# Patient Record
Sex: FEMALE | Race: WHITE | NOT HISPANIC OR LATINO | Employment: OTHER | URBAN - METROPOLITAN AREA
[De-identification: names, ages, dates, MRNs, and addresses within clinical notes are randomized per-mention and may not be internally consistent; named-entity substitution may affect disease eponyms.]

---

## 2023-08-09 LAB — URINE CULTURE: NORMAL

## 2023-08-20 PROBLEM — E86.0 DEHYDRATION: Status: ACTIVE | Noted: 2023-08-20

## 2023-08-20 PROBLEM — E66.3 OVERWEIGHT WITH BODY MASS INDEX (BMI) 25.0-29.9: Status: ACTIVE | Noted: 2023-08-20

## 2023-08-20 PROBLEM — Z86.010 HISTORY OF COLON POLYPS: Status: ACTIVE | Noted: 2023-08-20

## 2023-08-20 PROBLEM — M25.352: Status: ACTIVE | Noted: 2023-08-20

## 2023-08-20 PROBLEM — M17.0 PRIMARY OSTEOARTHRITIS OF BOTH KNEES: Status: ACTIVE | Noted: 2023-08-20

## 2023-08-20 PROBLEM — R26.2 DIFFICULTY WALKING: Status: ACTIVE | Noted: 2023-08-20

## 2023-08-20 PROBLEM — G47.33 OBSTRUCTIVE SLEEP APNEA SYNDROME: Status: ACTIVE | Noted: 2023-08-20

## 2023-08-20 PROBLEM — I10 BENIGN ESSENTIAL HYPERTENSION: Status: ACTIVE | Noted: 2023-08-20

## 2023-08-20 PROBLEM — F41.9 ANXIETY: Status: ACTIVE | Noted: 2023-08-20

## 2023-08-20 PROBLEM — M06.9 RHEUMATOID ARTHRITIS (MULTI): Status: ACTIVE | Noted: 2023-08-20

## 2023-08-20 PROBLEM — R53.1 ASTHENIA: Status: ACTIVE | Noted: 2023-08-20

## 2023-08-20 PROBLEM — I85.10 SECONDARY ESOPHAGEAL VARICES WITHOUT BLEEDING (MULTI): Status: ACTIVE | Noted: 2023-08-20

## 2023-08-20 PROBLEM — G57.92 NEUROPATHY OF LEFT LOWER EXTREMITY: Status: ACTIVE | Noted: 2023-08-20

## 2023-08-20 PROBLEM — M79.604 PAIN OF RIGHT LOWER EXTREMITY: Status: ACTIVE | Noted: 2023-08-20

## 2023-08-20 PROBLEM — M47.816 LUMBAR SPONDYLOSIS: Status: ACTIVE | Noted: 2023-08-20

## 2023-08-20 PROBLEM — M25.562 LEFT KNEE PAIN: Status: ACTIVE | Noted: 2023-08-20

## 2023-08-20 PROBLEM — K74.60 CIRRHOSIS OF LIVER WITHOUT ASCITES (MULTI): Status: ACTIVE | Noted: 2023-08-20

## 2023-08-20 PROBLEM — K70.30 ALCOHOLIC CIRRHOSIS OF LIVER WITHOUT ASCITES (MULTI): Status: ACTIVE | Noted: 2023-08-20

## 2023-08-20 PROBLEM — N18.31 STAGE 3A CHRONIC KIDNEY DISEASE (MULTI): Status: ACTIVE | Noted: 2023-08-20

## 2023-08-20 PROBLEM — R10.9 ABDOMINAL PAIN: Status: ACTIVE | Noted: 2023-08-20

## 2023-08-20 PROBLEM — M17.12 OSTEOARTHRITIS OF LEFT KNEE: Status: ACTIVE | Noted: 2023-08-20

## 2023-08-20 PROBLEM — K21.9 GASTROESOPHAGEAL REFLUX DISEASE: Status: ACTIVE | Noted: 2023-08-20

## 2023-08-20 PROBLEM — M25.552 LEFT HIP PAIN: Status: ACTIVE | Noted: 2023-08-20

## 2023-08-20 PROBLEM — L03.116 CELLULITIS OF LEFT LOWER LEG: Status: ACTIVE | Noted: 2023-08-20

## 2023-08-20 PROBLEM — K74.5 BILIARY CIRRHOSIS (MULTI): Status: ACTIVE | Noted: 2023-08-20

## 2023-08-20 PROBLEM — M16.12 OSTEOARTHRITIS OF LEFT HIP: Status: ACTIVE | Noted: 2023-08-20

## 2023-08-20 PROBLEM — M16.10 HIP ARTHRITIS: Status: ACTIVE | Noted: 2023-08-20

## 2023-08-20 PROBLEM — M81.0 AGE-RELATED OSTEOPOROSIS WITHOUT CURRENT PATHOLOGICAL FRACTURE: Status: ACTIVE | Noted: 2023-08-20

## 2023-08-20 PROBLEM — M17.10 ARTHRITIS OF KNEE: Status: ACTIVE | Noted: 2023-08-20

## 2023-08-20 PROBLEM — J44.9 CHRONIC OBSTRUCTIVE PULMONARY DISEASE (MULTI): Status: ACTIVE | Noted: 2023-08-20

## 2023-08-20 PROBLEM — S69.90XA INJURY OF HAND: Status: ACTIVE | Noted: 2023-08-20

## 2023-08-20 PROBLEM — I35.0 NONRHEUMATIC AORTIC VALVE STENOSIS: Status: ACTIVE | Noted: 2023-08-20

## 2023-08-20 PROBLEM — I85.11 SECONDARY ESOPHAGEAL VARICES WITH BLEEDING (MULTI): Status: ACTIVE | Noted: 2023-08-20

## 2023-08-20 PROBLEM — M13.0 DEGENERATIVE POLYARTHRITIS: Status: ACTIVE | Noted: 2023-08-20

## 2023-08-20 PROBLEM — K74.69 OTHER CIRRHOSIS OF LIVER (MULTI): Status: ACTIVE | Noted: 2023-08-20

## 2023-08-20 PROBLEM — Z96.642 STATUS POST LEFT HIP REPLACEMENT: Status: ACTIVE | Noted: 2023-08-20

## 2023-08-20 PROBLEM — K80.20 SYMPTOMATIC CHOLELITHIASIS: Status: ACTIVE | Noted: 2023-08-20

## 2023-08-20 PROBLEM — E03.8 SUBCLINICAL HYPOTHYROIDISM: Status: ACTIVE | Noted: 2023-08-20

## 2023-08-20 PROBLEM — Z98.890 HISTORY OF HIP SURGERY: Status: ACTIVE | Noted: 2023-08-20

## 2023-08-20 PROBLEM — R93.1 ECHOCARDIOGRAM ABNORMAL: Status: ACTIVE | Noted: 2023-08-20

## 2023-08-20 PROBLEM — R26.81 GAIT INSTABILITY: Status: ACTIVE | Noted: 2023-08-20

## 2023-08-20 PROBLEM — R07.89 ATYPICAL CHEST PAIN: Status: ACTIVE | Noted: 2023-08-20

## 2023-08-20 PROBLEM — M79.89 RIGHT LEG SWELLING: Status: ACTIVE | Noted: 2023-08-20

## 2023-08-20 RX ORDER — CHOLECALCIFEROL (VITAMIN D3) 50 MCG
TABLET ORAL
COMMUNITY

## 2023-08-20 RX ORDER — PANCRELIPASE 24000; 76000; 120000 [USP'U]/1; [USP'U]/1; [USP'U]/1
CAPSULE, DELAYED RELEASE PELLETS ORAL
COMMUNITY
Start: 2022-10-13

## 2023-08-20 RX ORDER — PANTOPRAZOLE SODIUM 40 MG/1
TABLET, DELAYED RELEASE ORAL
COMMUNITY

## 2023-08-20 RX ORDER — LANOLIN ALCOHOL/MO/W.PET/CERES
CREAM (GRAM) TOPICAL
COMMUNITY

## 2023-08-20 RX ORDER — GABAPENTIN 300 MG/1
CAPSULE ORAL
COMMUNITY
Start: 2022-10-03 | End: 2023-10-05 | Stop reason: SDUPTHER

## 2023-08-20 RX ORDER — DULOXETIN HYDROCHLORIDE 30 MG/1
CAPSULE, DELAYED RELEASE ORAL
COMMUNITY
Start: 2023-05-08

## 2023-08-20 RX ORDER — LETROZOLE 2.5 MG/1
TABLET, FILM COATED ORAL
COMMUNITY

## 2023-08-20 RX ORDER — ACETAMINOPHEN AND CODEINE PHOSPHATE 300; 30 MG/1; MG/1
TABLET ORAL
COMMUNITY
Start: 2022-06-10 | End: 2023-10-05 | Stop reason: ALTCHOICE

## 2023-08-20 RX ORDER — LOSARTAN POTASSIUM AND HYDROCHLOROTHIAZIDE 25; 100 MG/1; MG/1
TABLET ORAL
COMMUNITY
End: 2023-12-16 | Stop reason: HOSPADM

## 2023-08-20 RX ORDER — FUROSEMIDE 40 MG/1
TABLET ORAL
COMMUNITY
Start: 2020-09-10

## 2023-08-20 RX ORDER — SPIRONOLACTONE 50 MG/1
TABLET, FILM COATED ORAL
COMMUNITY
Start: 2023-01-12 | End: 2023-12-16 | Stop reason: HOSPADM

## 2023-09-16 ENCOUNTER — HOSPITAL ENCOUNTER (OUTPATIENT)
Dept: DATA CONVERSION | Facility: HOSPITAL | Age: 78
Discharge: HOME | End: 2023-09-16
Payer: MEDICARE

## 2023-09-16 DIAGNOSIS — R39.198 OTHER DIFFICULTIES WITH MICTURITION: ICD-10-CM

## 2023-09-16 DIAGNOSIS — Z88.0 ALLERGY STATUS TO PENICILLIN: ICD-10-CM

## 2023-09-16 DIAGNOSIS — Z90.49 ACQUIRED ABSENCE OF OTHER SPECIFIED PARTS OF DIGESTIVE TRACT: ICD-10-CM

## 2023-09-16 DIAGNOSIS — R10.84 GENERALIZED ABDOMINAL PAIN: ICD-10-CM

## 2023-09-16 DIAGNOSIS — K59.00 CONSTIPATION, UNSPECIFIED: ICD-10-CM

## 2023-09-16 LAB
ALBUMIN SERPL-MCNC: 4.6 GM/DL (ref 3.5–5)
ALBUMIN/GLOB SERPL: 1.3 RATIO (ref 1.5–3)
ALP BLD-CCNC: 148 U/L (ref 35–125)
ALT SERPL-CCNC: 31 U/L (ref 5–40)
ANION GAP SERPL CALCULATED.3IONS-SCNC: 15 MMOL/L (ref 0–19)
AST SERPL-CCNC: 46 U/L (ref 5–40)
BACTERIA SPEC CULT: NORMAL
BACTERIA UR QL AUTO: ABNORMAL
BASOPHILS # BLD AUTO: 0.03 K/UL (ref 0–0.22)
BASOPHILS NFR BLD AUTO: 0.4 % (ref 0–1)
BILIRUB SERPL-MCNC: 1.5 MG/DL (ref 0.1–1.2)
BILIRUB UR QL STRIP.AUTO: NEGATIVE
BUN SERPL-MCNC: 29 MG/DL (ref 8–25)
BUN/CREAT SERPL: 24.2 RATIO (ref 8–21)
CALCIUM SERPL-MCNC: 10.2 MG/DL (ref 8.5–10.4)
CC # UR: NORMAL /UL
CHLORIDE SERPL-SCNC: 98 MMOL/L (ref 97–107)
CLARITY UR: ABNORMAL
CO2 SERPL-SCNC: 23 MMOL/L (ref 24–31)
COLOR UR: ABNORMAL
CREAT SERPL-MCNC: 1.2 MG/DL (ref 0.4–1.6)
DEPRECATED RDW RBC AUTO: 55 FL (ref 37–54)
DIFFERENTIAL METHOD BLD: ABNORMAL
EOSINOPHIL # BLD AUTO: 0.06 K/UL (ref 0–0.45)
EOSINOPHIL NFR BLD: 0.8 % (ref 0–3)
ERYTHROCYTE [DISTWIDTH] IN BLOOD BY AUTOMATED COUNT: 17.6 % (ref 11.7–15)
GFR SERPL CREATININE-BSD FRML MDRD: 47 ML/MIN/1.73 M2
GLOBULIN SER-MCNC: 3.6 G/DL (ref 1.9–3.7)
GLUCOSE SERPL-MCNC: 105 MG/DL (ref 65–99)
GLUCOSE UR STRIP.AUTO-MCNC: NEGATIVE MG/DL
HCT VFR BLD AUTO: 43.2 % (ref 36–44)
HGB BLD-MCNC: 14.2 GM/DL (ref 12–15)
HGB UR QL STRIP.AUTO: ABNORMAL /HPF (ref 0–3)
HGB UR QL: ABNORMAL
IMM GRANULOCYTES # BLD AUTO: 0.02 K/UL (ref 0–0.1)
KETONES UR QL STRIP.AUTO: NEGATIVE
LEUKOCYTE ESTERASE UR QL STRIP.AUTO: ABNORMAL
LIPASE SERPL-CCNC: 23 U/L (ref 16–63)
LYMPHOCYTES # BLD AUTO: 0.72 K/UL (ref 1.2–3.2)
LYMPHOCYTES NFR BLD MANUAL: 9.7 % (ref 20–40)
MCH RBC QN AUTO: 27.9 PG (ref 26–34)
MCHC RBC AUTO-ENTMCNC: 32.9 % (ref 31–37)
MCV RBC AUTO: 84.9 FL (ref 80–100)
MICROSCOPIC (UA): ABNORMAL
MONOCYTES # BLD AUTO: 0.52 K/UL (ref 0–0.8)
MONOCYTES NFR BLD MANUAL: 7 % (ref 0–8)
NEUTROPHILS # BLD AUTO: 6.1 K/UL
NEUTROPHILS # BLD AUTO: 6.1 K/UL (ref 1.8–7.7)
NEUTROPHILS.IMMATURE NFR BLD: 0.3 % (ref 0–1)
NEUTS SEG NFR BLD: 81.8 % (ref 50–70)
NITRITE UR QL STRIP.AUTO: NEGATIVE
NRBC BLD-RTO: 0 /100 WBC
PH UR STRIP.AUTO: 8.5 [PH] (ref 4.6–8)
PLATELET # BLD AUTO: 155 K/UL (ref 150–450)
PMV BLD AUTO: 9.9 CU (ref 7–12.6)
POTASSIUM SERPL-SCNC: 4.3 MMOL/L (ref 3.4–5.1)
PROT SERPL-MCNC: 8.2 G/DL (ref 5.9–7.9)
PROT UR STRIP.AUTO-MCNC: 30 MG/DL
RBC # BLD AUTO: 5.09 M/UL (ref 4–4.9)
REPORT STATUS -LH SQ DATA CONVERSION: NORMAL
SERVICE CMNT-IMP: NORMAL
SODIUM SERPL-SCNC: 136 MMOL/L (ref 133–145)
SP GR UR STRIP.AUTO: 1.01 (ref 1–1.03)
SPECIMEN SOURCE: NORMAL
UNSPECIFIED CRY UR QL COMP ASSIST: ABNORMAL
URINE CULTURE: ABNORMAL
UROBILINOGEN UR QL STRIP.AUTO: 4 MG/DL (ref 0–1)
WBC # BLD AUTO: 7.5 K/UL (ref 4.5–11)
WBC #/AREA URNS AUTO: ABNORMAL /HPF (ref 0–3)

## 2023-10-05 ENCOUNTER — OFFICE VISIT (OUTPATIENT)
Dept: PRIMARY CARE | Facility: CLINIC | Age: 78
End: 2023-10-05
Payer: MEDICARE

## 2023-10-05 VITALS
DIASTOLIC BLOOD PRESSURE: 62 MMHG | WEIGHT: 135 LBS | OXYGEN SATURATION: 96 % | SYSTOLIC BLOOD PRESSURE: 120 MMHG | HEIGHT: 60 IN | RESPIRATION RATE: 16 BRPM | BODY MASS INDEX: 26.5 KG/M2 | HEART RATE: 81 BPM

## 2023-10-05 DIAGNOSIS — K70.30 ALCOHOLIC CIRRHOSIS OF LIVER WITHOUT ASCITES (MULTI): ICD-10-CM

## 2023-10-05 DIAGNOSIS — K59.03 DRUG-INDUCED CONSTIPATION: ICD-10-CM

## 2023-10-05 DIAGNOSIS — M81.0 AGE-RELATED OSTEOPOROSIS WITHOUT CURRENT PATHOLOGICAL FRACTURE: ICD-10-CM

## 2023-10-05 DIAGNOSIS — I10 BENIGN ESSENTIAL HYPERTENSION: ICD-10-CM

## 2023-10-05 DIAGNOSIS — Z85.3 PERSONAL HISTORY OF BREAST CANCER: ICD-10-CM

## 2023-10-05 DIAGNOSIS — M13.0 DEGENERATIVE POLYARTHRITIS: ICD-10-CM

## 2023-10-05 DIAGNOSIS — Z23 IMMUNIZATION DUE: Primary | ICD-10-CM

## 2023-10-05 PROCEDURE — 1036F TOBACCO NON-USER: CPT | Performed by: INTERNAL MEDICINE

## 2023-10-05 PROCEDURE — G0008 ADMIN INFLUENZA VIRUS VAC: HCPCS | Performed by: INTERNAL MEDICINE

## 2023-10-05 PROCEDURE — 3074F SYST BP LT 130 MM HG: CPT | Performed by: INTERNAL MEDICINE

## 2023-10-05 PROCEDURE — 99214 OFFICE O/P EST MOD 30 MIN: CPT | Performed by: INTERNAL MEDICINE

## 2023-10-05 PROCEDURE — 1159F MED LIST DOCD IN RCRD: CPT | Performed by: INTERNAL MEDICINE

## 2023-10-05 PROCEDURE — 1125F AMNT PAIN NOTED PAIN PRSNT: CPT | Performed by: INTERNAL MEDICINE

## 2023-10-05 PROCEDURE — 3078F DIAST BP <80 MM HG: CPT | Performed by: INTERNAL MEDICINE

## 2023-10-05 RX ORDER — POLYETHYLENE GLYCOL 3350 17 G/17G
17 POWDER, FOR SOLUTION ORAL DAILY
COMMUNITY

## 2023-10-05 RX ORDER — TROSPIUM CHLORIDE 20 MG/1
20 TABLET, FILM COATED ORAL 2 TIMES DAILY
COMMUNITY
End: 2023-12-13 | Stop reason: ENTERED-IN-ERROR

## 2023-10-05 RX ORDER — DOCUSATE SODIUM 100 MG/1
100 CAPSULE, LIQUID FILLED ORAL 2 TIMES DAILY
COMMUNITY

## 2023-10-05 ASSESSMENT — ENCOUNTER SYMPTOMS
PALPITATIONS: 1
DIARRHEA: 0
BACK PAIN: 1
DIZZINESS: 0
SEIZURES: 0
CONSTIPATION: 1
LOSS OF SENSATION IN FEET: 0
NAUSEA: 0
DEPRESSION: 1
HEADACHES: 0
OCCASIONAL FEELINGS OF UNSTEADINESS: 0
ARTHRALGIAS: 0
CHILLS: 0
SHORTNESS OF BREATH: 0
ABDOMINAL PAIN: 0
FREQUENCY: 0
FEVER: 0
HEMATURIA: 0

## 2023-10-05 ASSESSMENT — PATIENT HEALTH QUESTIONNAIRE - PHQ9
2. FEELING DOWN, DEPRESSED OR HOPELESS: SEVERAL DAYS
10. IF YOU CHECKED OFF ANY PROBLEMS, HOW DIFFICULT HAVE THESE PROBLEMS MADE IT FOR YOU TO DO YOUR WORK, TAKE CARE OF THINGS AT HOME, OR GET ALONG WITH OTHER PEOPLE: NOT DIFFICULT AT ALL
SUM OF ALL RESPONSES TO PHQ9 QUESTIONS 1 AND 2: 1
1. LITTLE INTEREST OR PLEASURE IN DOING THINGS: NOT AT ALL

## 2023-10-05 ASSESSMENT — PAIN SCALES - GENERAL: PAINLEVEL: 6

## 2023-10-05 NOTE — PROGRESS NOTES
Subjective   Patient ID: Kassandra Warner is a 78 y.o. female who presents for evaluation of constipation that began after starting iron supplement. She stopped taking her iron about 1 week ago. Still c/o intermittent constipation. Takes stool softener. Follows with urologist and denies any urinary frequency and nocturia. Endorses pain in her L lower back that radiates down her L leg. Does not monitor her BP at home and reports occ palpitations.    Medication Documentation Review Audit       Reviewed by Jason Soria MA (Medical Assistant) on 10/05/23 at 1328      Medication Order Taking? Sig Documenting Provider Last Dose Status     Discontinued 10/05/23 1315   cholecalciferol (Vitamin D-3) 50 MCG (2000 UT) tablet [id number] Yes 1 tablet Orally Once a day for 30 day(s) Historical Provider, MD Taking Active   cyanocobalamin (Vitamin B-12) 1,000 mcg tablet [id number] Yes 1 tablet Orally Once a day for 30 day(s) Historical Provider, MD Taking Active   docusate sodium (Colace) 100 mg capsule [id number] Yes Take 1 capsule (100 mg) by mouth 2 times a day. Historical Provider, MD Taking Active   DULoxetine (Cymbalta) 30 mg DR capsule [id number] Yes 1 capsule Orally Once a day for 90 day(s) Historical Provider, MD Taking Active   furosemide (Lasix) 40 mg tablet [id number] Yes 1 tablet Orally once daily for 90 days Historical Provider, MD Taking Active   gabapentin (Neurontin) 300 mg capsule [id number] Yes 1 capsule Orally once at bedtime Historical Provider, MD Taking Active   letrozole (Femara) 2.5 mg tablet [id number] Yes 1 tablet Orally Once a day for 30 day(s) Historical Provider, MD Taking Active   lipase-protease-amylase (Creon) 24,000-76,000 -120,000 unit capsule [id number] Yes as directed Orally twice a day with meal for 30 day(s) Historical Provider, MD Taking Active   losartan-hydrochlorothiazide (Hyzaar) 100-25 mg tablet [id number] Yes 1 tablet Orally Once a day for 90 days Historical Provider, MD Taking Active    pantoprazole (ProtoNix) 40 mg EC tablet [id number] Yes 1 tablet Orally every 12 hours for 90 days Historical Provider, MD Taking Active   polyethylene glycol (Glycolax, Miralax) 17 gram packet [id number] Yes Take 17 g by mouth once daily. Historical Provider, MD Taking Active   spironolactone (Aldactone) 50 mg tablet [id number] Yes 1 tablet Orally Once a day for 90 day(s) Historical Provider, MD Taking Active   trospium (Sanctura) 20 mg tablet [id number] Yes Take 1 tablet (20 mg) by mouth 2 times a day. Trospium chloride Historical Provider, MD Taking Active                  Past Medical History:   Diagnosis Date    Aortic stenosis     Breast cancer (CMS/HCC)     COPD (chronic obstructive pulmonary disease) (CMS/HCC)     Foot ulcer (CMS/HCC)     Gallstones     Hiatal hernia     Hypertension     Left hip pain     Left knee pain     Liver cirrhosis (CMS/HCC)     Low back pain     Rosacea      Past Surgical History:   Procedure Laterality Date    APPENDECTOMY      BREAST LUMPECTOMY Bilateral     CATARACT EXTRACTION, BILATERAL      CHOLECYSTECTOMY      LIVER BIOPSY      REPLACEMENT TOTAL KNEE ONCOLOGIC Right        Review of Systems   Constitutional:  Negative for chills and fever.   HENT:  Negative for dental problem and ear pain.    Respiratory:  Negative for shortness of breath.    Cardiovascular:  Positive for palpitations (occ). Negative for chest pain.   Gastrointestinal:  Positive for constipation. Negative for abdominal pain, diarrhea and nausea.   Endocrine: Negative for cold intolerance.   Genitourinary:  Negative for frequency and hematuria.   Musculoskeletal:  Positive for back pain. Negative for arthralgias.        L leg pain   Skin:  Negative for rash.   Neurological:  Negative for dizziness, seizures and headaches.     Objective   /62   Pulse 81   Resp 16   Ht 1.524 m (5')   Wt 61.2 kg (135 lb)   SpO2 96%   BMI 26.37 kg/m²     Physical Exam  HENT:      Right Ear: Tympanic membrane normal.       Left Ear: Tympanic membrane normal.      Mouth/Throat:      Pharynx: Oropharynx is clear. No oropharyngeal exudate.   Eyes:      Conjunctiva/sclera: Conjunctivae normal.      Pupils: Pupils are equal, round, and reactive to light.   Neck:      Thyroid: No thyromegaly.      Vascular: No carotid bruit.   Cardiovascular:      Rate and Rhythm: Regular rhythm.      Heart sounds: Normal heart sounds.   Pulmonary:      Breath sounds: Normal breath sounds.   Abdominal:      Palpations: Abdomen is soft. There is no hepatomegaly.      Tenderness: There is no abdominal tenderness.   Musculoskeletal:      Right lower le+ Pitting Edema present.      Left lower le+ Pitting Edema present.   Lymphadenopathy:      Cervical: No cervical adenopathy.   Skin:     General: Skin is warm.   Neurological:      Mental Status: She is alert.      Motor: Motor function is intact.      Coordination: Coordination is intact.   Psychiatric:         Mood and Affect: Mood and affect normal.         Behavior: Behavior normal. Behavior is cooperative.         Cognition and Memory: Cognition normal.     Diagnostics Reviewed: 2023 CT abdomen: hiatal hernia, cirrhosis of the liver, stool in colon  Labs Reviewed: 2023 urine culture: mixed kathya, Wbc nml, Rbc nml      Assessment/Plan   Diagnoses and all orders for this visit:  Immunization due  Alcoholic cirrhosis of liver without ascites (CMS/HCC)  Degenerative polyarthritis  Benign essential hypertension  Personal history of breast cancer      By signing my name below, Michelle RODRÍGUEZ Scribe   attest that this documentation has been prepared under the direction and in the presence of Evie Rao MD.

## 2023-10-15 RX ORDER — GABAPENTIN 300 MG/1
CAPSULE ORAL
Qty: 90 CAPSULE | Refills: 3 | Status: SHIPPED | OUTPATIENT
Start: 2023-10-15

## 2023-12-07 ENCOUNTER — APPOINTMENT (OUTPATIENT)
Dept: HEMATOLOGY/ONCOLOGY | Facility: CLINIC | Age: 78
End: 2023-12-07
Payer: MEDICARE

## 2023-12-12 ENCOUNTER — APPOINTMENT (OUTPATIENT)
Dept: RADIOLOGY | Facility: HOSPITAL | Age: 78
DRG: 683 | End: 2023-12-12
Payer: MEDICARE

## 2023-12-12 ENCOUNTER — HOSPITAL ENCOUNTER (INPATIENT)
Facility: HOSPITAL | Age: 78
LOS: 3 days | Discharge: HOME | DRG: 683 | End: 2023-12-16
Attending: EMERGENCY MEDICINE | Admitting: INTERNAL MEDICINE
Payer: MEDICARE

## 2023-12-12 DIAGNOSIS — L03.116 LEFT LEG CELLULITIS: ICD-10-CM

## 2023-12-12 DIAGNOSIS — F41.9 ANXIETY: ICD-10-CM

## 2023-12-12 DIAGNOSIS — R74.01 TRANSAMINASEMIA: ICD-10-CM

## 2023-12-12 DIAGNOSIS — R60.0 LOWER EXTREMITY EDEMA: ICD-10-CM

## 2023-12-12 DIAGNOSIS — M25.352 UNSTABLE LEFT HIP: ICD-10-CM

## 2023-12-12 DIAGNOSIS — M17.12 PRIMARY OSTEOARTHRITIS OF LEFT KNEE: ICD-10-CM

## 2023-12-12 DIAGNOSIS — M25.562 ACUTE PAIN OF LEFT KNEE: ICD-10-CM

## 2023-12-12 DIAGNOSIS — M17.10 ARTHRITIS OF KNEE: ICD-10-CM

## 2023-12-12 DIAGNOSIS — K70.31 ASCITES DUE TO ALCOHOLIC CIRRHOSIS (MULTI): ICD-10-CM

## 2023-12-12 DIAGNOSIS — L03.119 CELLULITIS OF LOWER EXTREMITY, UNSPECIFIED LATERALITY: ICD-10-CM

## 2023-12-12 DIAGNOSIS — E87.6 HYPOKALEMIA: ICD-10-CM

## 2023-12-12 DIAGNOSIS — R10.9 ABDOMINAL PAIN, UNSPECIFIED ABDOMINAL LOCATION: ICD-10-CM

## 2023-12-12 DIAGNOSIS — N17.9 ACUTE KIDNEY INJURY (CMS-HCC): Primary | ICD-10-CM

## 2023-12-12 LAB
ALBUMIN SERPL-MCNC: 3.7 G/DL (ref 3.5–5)
ALP BLD-CCNC: 254 U/L (ref 35–125)
ALT SERPL-CCNC: 35 U/L (ref 5–40)
ANION GAP SERPL CALC-SCNC: 14 MMOL/L
AST SERPL-CCNC: 88 U/L (ref 5–40)
BASOPHILS # BLD AUTO: 0.03 X10*3/UL (ref 0–0.1)
BASOPHILS NFR BLD AUTO: 0.3 %
BILIRUB SERPL-MCNC: 2.1 MG/DL (ref 0.1–1.2)
BUN SERPL-MCNC: 47 MG/DL (ref 8–25)
CALCIUM SERPL-MCNC: 9.5 MG/DL (ref 8.5–10.4)
CHLORIDE SERPL-SCNC: 100 MMOL/L (ref 97–107)
CO2 SERPL-SCNC: 20 MMOL/L (ref 24–31)
CREAT SERPL-MCNC: 2.4 MG/DL (ref 0.4–1.6)
EOSINOPHIL # BLD AUTO: 0.09 X10*3/UL (ref 0–0.4)
EOSINOPHIL NFR BLD AUTO: 1 %
ERYTHROCYTE [DISTWIDTH] IN BLOOD BY AUTOMATED COUNT: 16.3 % (ref 11.5–14.5)
GFR SERPL CREATININE-BSD FRML MDRD: 20 ML/MIN/1.73M*2
GLUCOSE SERPL-MCNC: 136 MG/DL (ref 65–99)
HCT VFR BLD AUTO: 35.3 % (ref 36–46)
HGB BLD-MCNC: 11.6 G/DL (ref 12–16)
IMM GRANULOCYTES # BLD AUTO: 0.03 X10*3/UL (ref 0–0.5)
IMM GRANULOCYTES NFR BLD AUTO: 0.3 % (ref 0–0.9)
LIPASE SERPL-CCNC: 37 U/L (ref 16–63)
LYMPHOCYTES # BLD AUTO: 0.73 X10*3/UL (ref 0.8–3)
LYMPHOCYTES NFR BLD AUTO: 8.4 %
MAGNESIUM SERPL-MCNC: 2.1 MG/DL (ref 1.6–3.1)
MCH RBC QN AUTO: 28.9 PG (ref 26–34)
MCHC RBC AUTO-ENTMCNC: 32.9 G/DL (ref 32–36)
MCV RBC AUTO: 88 FL (ref 80–100)
MONOCYTES # BLD AUTO: 0.96 X10*3/UL (ref 0.05–0.8)
MONOCYTES NFR BLD AUTO: 11.1 %
NEUTROPHILS # BLD AUTO: 6.83 X10*3/UL (ref 1.6–5.5)
NEUTROPHILS NFR BLD AUTO: 78.9 %
NRBC BLD-RTO: 0 /100 WBCS (ref 0–0)
NT-PROBNP SERPL-MCNC: 216 PG/ML (ref 0–624)
PLATELET # BLD AUTO: 171 X10*3/UL (ref 150–450)
POTASSIUM SERPL-SCNC: 3.1 MMOL/L (ref 3.4–5.1)
PROT SERPL-MCNC: 6.8 G/DL (ref 5.9–7.9)
RBC # BLD AUTO: 4.02 X10*6/UL (ref 4–5.2)
SODIUM SERPL-SCNC: 134 MMOL/L (ref 133–145)
TROPONIN T SERPL-MCNC: 26 NG/L
TROPONIN T SERPL-MCNC: 27 NG/L
WBC # BLD AUTO: 8.7 X10*3/UL (ref 4.4–11.3)

## 2023-12-12 PROCEDURE — 84484 ASSAY OF TROPONIN QUANT: CPT | Performed by: CLINICAL NURSE SPECIALIST

## 2023-12-12 PROCEDURE — 74176 CT ABD & PELVIS W/O CONTRAST: CPT

## 2023-12-12 PROCEDURE — 73564 X-RAY EXAM KNEE 4 OR MORE: CPT | Mod: LT,FY

## 2023-12-12 PROCEDURE — 36415 COLL VENOUS BLD VENIPUNCTURE: CPT | Performed by: CLINICAL NURSE SPECIALIST

## 2023-12-12 PROCEDURE — 87636 SARSCOV2 & INF A&B AMP PRB: CPT | Performed by: EMERGENCY MEDICINE

## 2023-12-12 PROCEDURE — 85025 COMPLETE CBC W/AUTO DIFF WBC: CPT | Performed by: CLINICAL NURSE SPECIALIST

## 2023-12-12 PROCEDURE — 70450 CT HEAD/BRAIN W/O DYE: CPT

## 2023-12-12 PROCEDURE — 80053 COMPREHEN METABOLIC PANEL: CPT | Performed by: CLINICAL NURSE SPECIALIST

## 2023-12-12 PROCEDURE — 2500000001 HC RX 250 WO HCPCS SELF ADMINISTERED DRUGS (ALT 637 FOR MEDICARE OP): Performed by: CLINICAL NURSE SPECIALIST

## 2023-12-12 PROCEDURE — 93971 EXTREMITY STUDY: CPT

## 2023-12-12 PROCEDURE — 71046 X-RAY EXAM CHEST 2 VIEWS: CPT | Mod: FY

## 2023-12-12 PROCEDURE — 83735 ASSAY OF MAGNESIUM: CPT | Performed by: CLINICAL NURSE SPECIALIST

## 2023-12-12 PROCEDURE — 99285 EMERGENCY DEPT VISIT HI MDM: CPT | Mod: 25 | Performed by: EMERGENCY MEDICINE

## 2023-12-12 PROCEDURE — 83880 ASSAY OF NATRIURETIC PEPTIDE: CPT | Performed by: CLINICAL NURSE SPECIALIST

## 2023-12-12 PROCEDURE — 83690 ASSAY OF LIPASE: CPT | Performed by: CLINICAL NURSE SPECIALIST

## 2023-12-12 RX ORDER — POTASSIUM CHLORIDE 1.5 G/1.58G
40 POWDER, FOR SOLUTION ORAL ONCE
Status: COMPLETED | OUTPATIENT
Start: 2023-12-12 | End: 2023-12-12

## 2023-12-12 RX ADMIN — POTASSIUM CHLORIDE 40 MEQ: 1.5 POWDER, FOR SOLUTION ORAL at 22:17

## 2023-12-12 ASSESSMENT — LIFESTYLE VARIABLES
EVER HAD A DRINK FIRST THING IN THE MORNING TO STEADY YOUR NERVES TO GET RID OF A HANGOVER: NO
EVER FELT BAD OR GUILTY ABOUT YOUR DRINKING: NO
REASON UNABLE TO ASSESS: NO
HAVE PEOPLE ANNOYED YOU BY CRITICIZING YOUR DRINKING: NO
HAVE YOU EVER FELT YOU SHOULD CUT DOWN ON YOUR DRINKING: NO

## 2023-12-12 ASSESSMENT — COLUMBIA-SUICIDE SEVERITY RATING SCALE - C-SSRS
2. HAVE YOU ACTUALLY HAD ANY THOUGHTS OF KILLING YOURSELF?: NO
1. IN THE PAST MONTH, HAVE YOU WISHED YOU WERE DEAD OR WISHED YOU COULD GO TO SLEEP AND NOT WAKE UP?: NO
6. HAVE YOU EVER DONE ANYTHING, STARTED TO DO ANYTHING, OR PREPARED TO DO ANYTHING TO END YOUR LIFE?: NO

## 2023-12-12 ASSESSMENT — PAIN - FUNCTIONAL ASSESSMENT: PAIN_FUNCTIONAL_ASSESSMENT: 0-10

## 2023-12-12 ASSESSMENT — PAIN DESCRIPTION - PROGRESSION: CLINICAL_PROGRESSION: NOT CHANGED

## 2023-12-13 ENCOUNTER — APPOINTMENT (OUTPATIENT)
Dept: RADIOLOGY | Facility: HOSPITAL | Age: 78
DRG: 683 | End: 2023-12-13
Payer: MEDICARE

## 2023-12-13 PROBLEM — L03.116 LEFT LEG CELLULITIS: Status: RESOLVED | Noted: 2023-08-20 | Resolved: 2023-12-13

## 2023-12-13 PROBLEM — R18.8 ASCITES: Status: ACTIVE | Noted: 2023-12-13

## 2023-12-13 LAB
AFP SERPL-MCNC: 32 NG/ML (ref 0–9)
ALBUMIN FLD-MCNC: 0.6 G/DL
ALBUMIN SERPL-MCNC: 3.3 G/DL (ref 3.5–5)
ALP BLD-CCNC: 249 U/L (ref 35–125)
ALT SERPL-CCNC: 33 U/L (ref 5–40)
AMMONIA PLAS-SCNC: 80 UMOL/L (ref 12–45)
ANION GAP SERPL CALC-SCNC: 14 MMOL/L
APPEARANCE UR: CLEAR
APTT PPP: 27.7 SECONDS (ref 22–32.5)
AST SERPL-CCNC: 85 U/L (ref 5–40)
BASOPHILS NFR FLD MANUAL: 0 %
BILIRUB SERPL-MCNC: 1.9 MG/DL (ref 0.1–1.2)
BILIRUB UR STRIP.AUTO-MCNC: NEGATIVE MG/DL
BLASTS NFR FLD MANUAL: 0 %
BUN SERPL-MCNC: 47 MG/DL (ref 8–25)
CALCIUM SERPL-MCNC: 9.4 MG/DL (ref 8.5–10.4)
CHLORIDE SERPL-SCNC: 104 MMOL/L (ref 97–107)
CLARITY FLD: CLEAR
CO2 SERPL-SCNC: 18 MMOL/L (ref 24–31)
COLOR FLD: YELLOW
COLOR UR: YELLOW
CREAT SERPL-MCNC: 2.1 MG/DL (ref 0.4–1.6)
EOSINOPHIL NFR FLD MANUAL: 0 %
ERYTHROCYTE [DISTWIDTH] IN BLOOD BY AUTOMATED COUNT: 16.3 % (ref 11.5–14.5)
ETHANOL SERPL-MCNC: <0.01 G/DL
FLUAV RNA RESP QL NAA+PROBE: NOT DETECTED
FLUBV RNA RESP QL NAA+PROBE: NOT DETECTED
GFR SERPL CREATININE-BSD FRML MDRD: 24 ML/MIN/1.73M*2
GLUCOSE SERPL-MCNC: 97 MG/DL (ref 65–99)
GLUCOSE UR STRIP.AUTO-MCNC: NORMAL MG/DL
HAV IGM SER QL: NONREACTIVE
HBV CORE IGM SER QL: NONREACTIVE
HBV SURFACE AG SERPL QL IA: NONREACTIVE
HCT VFR BLD AUTO: 33.1 % (ref 36–46)
HCV AB SER QL: NONREACTIVE
HGB BLD-MCNC: 11.1 G/DL (ref 12–16)
HYALINE CASTS #/AREA URNS AUTO: ABNORMAL /LPF
IMMATURE GRANULOCYTES IN FLUID: 0 %
INR PPP: 1.1 (ref 0.9–1.2)
KETONES UR STRIP.AUTO-MCNC: NEGATIVE MG/DL
LEUKOCYTE ESTERASE UR QL STRIP.AUTO: NEGATIVE
LYMPHOCYTES NFR FLD MANUAL: 42 %
MCH RBC QN AUTO: 29.2 PG (ref 26–34)
MCHC RBC AUTO-ENTMCNC: 33.5 G/DL (ref 32–36)
MCV RBC AUTO: 87 FL (ref 80–100)
MONOS+MACROS NFR FLD MANUAL: 52 %
MUCOUS THREADS #/AREA URNS AUTO: ABNORMAL /LPF
NEUTROPHILS NFR FLD MANUAL: 6 %
NITRITE UR QL STRIP.AUTO: NEGATIVE
NRBC BLD-RTO: 0 /100 WBCS (ref 0–0)
OTHER CELLS NFR FLD MANUAL: 0 %
PH UR STRIP.AUTO: 5.5 [PH]
PLASMA CELLS NFR FLD MANUAL: 0 %
PLATELET # BLD AUTO: 132 X10*3/UL (ref 150–450)
POTASSIUM SERPL-SCNC: 3.9 MMOL/L (ref 3.4–5.1)
PROT FLD-MCNC: 1 G/DL
PROT SERPL-MCNC: 6.3 G/DL (ref 5.9–7.9)
PROT UR STRIP.AUTO-MCNC: NORMAL MG/DL
PROTHROMBIN TIME: 11.8 SECONDS (ref 9.3–12.7)
RBC # BLD AUTO: 3.8 X10*6/UL (ref 4–5.2)
RBC # FLD AUTO: 2000 /UL
RBC # UR STRIP.AUTO: NEGATIVE /UL
RBC #/AREA URNS AUTO: ABNORMAL /HPF
SARS-COV-2 RNA RESP QL NAA+PROBE: NOT DETECTED
SODIUM SERPL-SCNC: 136 MMOL/L (ref 133–145)
SP GR UR STRIP.AUTO: 1.01
SQUAMOUS #/AREA URNS AUTO: ABNORMAL /HPF
TOTAL CELLS COUNTED PRT: 100
TRANS CELLS #/AREA UR COMP ASSIST: ABNORMAL /HPF
UROBILINOGEN UR STRIP.AUTO-MCNC: NORMAL MG/DL
WBC # BLD AUTO: 7 X10*3/UL (ref 4.4–11.3)
WBC # FLD AUTO: 311 /UL
WBC #/AREA URNS AUTO: ABNORMAL /HPF

## 2023-12-13 PROCEDURE — 87075 CULTR BACTERIA EXCEPT BLOOD: CPT | Mod: WESLAB | Performed by: INTERNAL MEDICINE

## 2023-12-13 PROCEDURE — 82105 ALPHA-FETOPROTEIN SERUM: CPT | Mod: WESLAB | Performed by: INTERNAL MEDICINE

## 2023-12-13 PROCEDURE — 36415 COLL VENOUS BLD VENIPUNCTURE: CPT | Performed by: INTERNAL MEDICINE

## 2023-12-13 PROCEDURE — 49083 ABD PARACENTESIS W/IMAGING: CPT

## 2023-12-13 PROCEDURE — 84157 ASSAY OF PROTEIN OTHER: CPT | Mod: WESLAB | Performed by: INTERNAL MEDICINE

## 2023-12-13 PROCEDURE — 1200000002 HC GENERAL ROOM WITH TELEMETRY DAILY

## 2023-12-13 PROCEDURE — 2720000007 HC OR 272 NO HCPCS

## 2023-12-13 PROCEDURE — 88112 CYTOPATH CELL ENHANCE TECH: CPT | Performed by: PATHOLOGY

## 2023-12-13 PROCEDURE — C1729 CATH, DRAINAGE: HCPCS

## 2023-12-13 PROCEDURE — 85027 COMPLETE CBC AUTOMATED: CPT | Performed by: INTERNAL MEDICINE

## 2023-12-13 PROCEDURE — 85610 PROTHROMBIN TIME: CPT | Performed by: INTERNAL MEDICINE

## 2023-12-13 PROCEDURE — 82077 ASSAY SPEC XCP UR&BREATH IA: CPT | Performed by: EMERGENCY MEDICINE

## 2023-12-13 PROCEDURE — 36415 COLL VENOUS BLD VENIPUNCTURE: CPT | Performed by: EMERGENCY MEDICINE

## 2023-12-13 PROCEDURE — 88305 TISSUE EXAM BY PATHOLOGIST: CPT | Mod: TC | Performed by: INTERNAL MEDICINE

## 2023-12-13 PROCEDURE — 2500000004 HC RX 250 GENERAL PHARMACY W/ HCPCS (ALT 636 FOR OP/ED): Performed by: INTERNAL MEDICINE

## 2023-12-13 PROCEDURE — 2500000005 HC RX 250 GENERAL PHARMACY W/O HCPCS: Performed by: STUDENT IN AN ORGANIZED HEALTH CARE EDUCATION/TRAINING PROGRAM

## 2023-12-13 PROCEDURE — 89051 BODY FLUID CELL COUNT: CPT | Performed by: INTERNAL MEDICINE

## 2023-12-13 PROCEDURE — 82140 ASSAY OF AMMONIA: CPT | Performed by: EMERGENCY MEDICINE

## 2023-12-13 PROCEDURE — 0W9G3ZX DRAINAGE OF PERITONEAL CAVITY, PERCUTANEOUS APPROACH, DIAGNOSTIC: ICD-10-PCS | Performed by: STUDENT IN AN ORGANIZED HEALTH CARE EDUCATION/TRAINING PROGRAM

## 2023-12-13 PROCEDURE — 81001 URINALYSIS AUTO W/SCOPE: CPT | Performed by: CLINICAL NURSE SPECIALIST

## 2023-12-13 PROCEDURE — 82042 OTHER SOURCE ALBUMIN QUAN EA: CPT | Mod: WESLAB | Performed by: INTERNAL MEDICINE

## 2023-12-13 PROCEDURE — 2500000001 HC RX 250 WO HCPCS SELF ADMINISTERED DRUGS (ALT 637 FOR MEDICARE OP): Performed by: INTERNAL MEDICINE

## 2023-12-13 PROCEDURE — 88305 TISSUE EXAM BY PATHOLOGIST: CPT | Performed by: PATHOLOGY

## 2023-12-13 PROCEDURE — 85730 THROMBOPLASTIN TIME PARTIAL: CPT | Performed by: INTERNAL MEDICINE

## 2023-12-13 PROCEDURE — 87205 SMEAR GRAM STAIN: CPT | Mod: WESLAB | Performed by: INTERNAL MEDICINE

## 2023-12-13 PROCEDURE — 80053 COMPREHEN METABOLIC PANEL: CPT | Performed by: INTERNAL MEDICINE

## 2023-12-13 PROCEDURE — 80074 ACUTE HEPATITIS PANEL: CPT | Mod: WESLAB | Performed by: INTERNAL MEDICINE

## 2023-12-13 RX ORDER — CHOLECALCIFEROL (VITAMIN D3) 50 MCG
2000 TABLET ORAL DAILY
Status: DISCONTINUED | OUTPATIENT
Start: 2023-12-13 | End: 2023-12-16 | Stop reason: HOSPADM

## 2023-12-13 RX ORDER — LIDOCAINE HYDROCHLORIDE 20 MG/ML
INJECTION, SOLUTION EPIDURAL; INFILTRATION; INTRACAUDAL; PERINEURAL
Status: COMPLETED | OUTPATIENT
Start: 2023-12-13 | End: 2023-12-13

## 2023-12-13 RX ORDER — PANTOPRAZOLE SODIUM 40 MG/1
40 TABLET, DELAYED RELEASE ORAL
Status: DISCONTINUED | OUTPATIENT
Start: 2023-12-13 | End: 2023-12-16 | Stop reason: HOSPADM

## 2023-12-13 RX ORDER — POLYETHYLENE GLYCOL 3350 17 G/17G
17 POWDER, FOR SOLUTION ORAL DAILY
Status: DISCONTINUED | OUTPATIENT
Start: 2023-12-13 | End: 2023-12-16 | Stop reason: HOSPADM

## 2023-12-13 RX ORDER — ACETAMINOPHEN 650 MG/1
650 SUPPOSITORY RECTAL EVERY 4 HOURS PRN
Status: DISCONTINUED | OUTPATIENT
Start: 2023-12-13 | End: 2023-12-16 | Stop reason: HOSPADM

## 2023-12-13 RX ORDER — ACETAMINOPHEN 500 MG
5 TABLET ORAL NIGHTLY PRN
Status: DISCONTINUED | OUTPATIENT
Start: 2023-12-13 | End: 2023-12-16 | Stop reason: HOSPADM

## 2023-12-13 RX ORDER — OXYBUTYNIN CHLORIDE 5 MG/1
5 TABLET ORAL 2 TIMES DAILY
Status: DISCONTINUED | OUTPATIENT
Start: 2023-12-13 | End: 2023-12-16 | Stop reason: HOSPADM

## 2023-12-13 RX ORDER — DOXYCYCLINE 100 MG/1
100 CAPSULE ORAL EVERY 12 HOURS SCHEDULED
Status: DISCONTINUED | OUTPATIENT
Start: 2023-12-13 | End: 2023-12-13

## 2023-12-13 RX ORDER — DOCUSATE SODIUM 100 MG/1
100 CAPSULE, LIQUID FILLED ORAL 2 TIMES DAILY
Status: DISCONTINUED | OUTPATIENT
Start: 2023-12-13 | End: 2023-12-16 | Stop reason: HOSPADM

## 2023-12-13 RX ORDER — ACETAMINOPHEN 160 MG/5ML
650 SOLUTION ORAL EVERY 4 HOURS PRN
Status: DISCONTINUED | OUTPATIENT
Start: 2023-12-13 | End: 2023-12-16 | Stop reason: HOSPADM

## 2023-12-13 RX ORDER — MIRABEGRON 25 MG/1
25 TABLET, FILM COATED, EXTENDED RELEASE ORAL DAILY
COMMUNITY

## 2023-12-13 RX ORDER — GABAPENTIN 300 MG/1
300 CAPSULE ORAL NIGHTLY
Status: DISCONTINUED | OUTPATIENT
Start: 2023-12-13 | End: 2023-12-16 | Stop reason: HOSPADM

## 2023-12-13 RX ORDER — FUROSEMIDE 10 MG/ML
40 INJECTION INTRAMUSCULAR; INTRAVENOUS ONCE
Status: COMPLETED | OUTPATIENT
Start: 2023-12-13 | End: 2023-12-13

## 2023-12-13 RX ORDER — POLYETHYLENE GLYCOL 3350 17 G/17G
17 POWDER, FOR SOLUTION ORAL DAILY PRN
Status: DISCONTINUED | OUTPATIENT
Start: 2023-12-13 | End: 2023-12-16 | Stop reason: HOSPADM

## 2023-12-13 RX ORDER — ACETAMINOPHEN 325 MG/1
650 TABLET ORAL EVERY 4 HOURS PRN
Status: DISCONTINUED | OUTPATIENT
Start: 2023-12-13 | End: 2023-12-16 | Stop reason: HOSPADM

## 2023-12-13 RX ORDER — LANOLIN ALCOHOL/MO/W.PET/CERES
1000 CREAM (GRAM) TOPICAL DAILY
Status: DISCONTINUED | OUTPATIENT
Start: 2023-12-13 | End: 2023-12-16 | Stop reason: HOSPADM

## 2023-12-13 RX ORDER — LETROZOLE 2.5 MG/1
2.5 TABLET, FILM COATED ORAL DAILY
Status: DISCONTINUED | OUTPATIENT
Start: 2023-12-13 | End: 2023-12-16 | Stop reason: HOSPADM

## 2023-12-13 RX ADMIN — PANCRELIPASE 1 CAPSULE: 24000; 76000; 120000 CAPSULE, DELAYED RELEASE PELLETS ORAL at 02:46

## 2023-12-13 RX ADMIN — PANTOPRAZOLE SODIUM 40 MG: 40 TABLET, DELAYED RELEASE ORAL at 08:37

## 2023-12-13 RX ADMIN — PANCRELIPASE 1 CAPSULE: 24000; 76000; 120000 CAPSULE, DELAYED RELEASE PELLETS ORAL at 22:00

## 2023-12-13 RX ADMIN — CEFEPIME 1 G: 1 INJECTION, POWDER, FOR SOLUTION INTRAMUSCULAR; INTRAVENOUS at 15:48

## 2023-12-13 RX ADMIN — CYANOCOBALAMIN TAB 1000 MCG 1000 MCG: 1000 TAB at 08:36

## 2023-12-13 RX ADMIN — LIDOCAINE HYDROCHLORIDE 7 ML: 20 INJECTION, SOLUTION EPIDURAL; INFILTRATION; INTRACAUDAL; PERINEURAL at 11:50

## 2023-12-13 RX ADMIN — FUROSEMIDE 40 MG: 10 INJECTION, SOLUTION INTRAMUSCULAR; INTRAVENOUS at 02:46

## 2023-12-13 RX ADMIN — OXYBUTYNIN CHLORIDE 5 MG: 5 TABLET ORAL at 08:36

## 2023-12-13 RX ADMIN — DOCUSATE SODIUM 100 MG: 100 CAPSULE, LIQUID FILLED ORAL at 22:00

## 2023-12-13 RX ADMIN — PANCRELIPASE 1 CAPSULE: 24000; 76000; 120000 CAPSULE, DELAYED RELEASE PELLETS ORAL at 08:36

## 2023-12-13 RX ADMIN — OXYBUTYNIN CHLORIDE 5 MG: 5 TABLET ORAL at 22:00

## 2023-12-13 RX ADMIN — Medication 2000 UNITS: at 08:36

## 2023-12-13 RX ADMIN — GABAPENTIN 300 MG: 300 CAPSULE ORAL at 22:00

## 2023-12-13 RX ADMIN — LETROZOLE 2.5 MG: 2.5 TABLET ORAL at 10:02

## 2023-12-13 RX ADMIN — ACETAMINOPHEN 650 MG: 325 TABLET ORAL at 22:02

## 2023-12-13 SDOH — SOCIAL STABILITY: SOCIAL INSECURITY: HAVE YOU HAD THOUGHTS OF HARMING ANYONE ELSE?: NO

## 2023-12-13 SDOH — SOCIAL STABILITY: SOCIAL INSECURITY: DO YOU FEEL ANYONE HAS EXPLOITED OR TAKEN ADVANTAGE OF YOU FINANCIALLY OR OF YOUR PERSONAL PROPERTY?: NO

## 2023-12-13 SDOH — SOCIAL STABILITY: SOCIAL INSECURITY: DO YOU FEEL UNSAFE GOING BACK TO THE PLACE WHERE YOU ARE LIVING?: NO

## 2023-12-13 SDOH — SOCIAL STABILITY: SOCIAL INSECURITY: ARE YOU OR HAVE YOU BEEN THREATENED OR ABUSED PHYSICALLY, EMOTIONALLY, OR SEXUALLY BY ANYONE?: NO

## 2023-12-13 SDOH — SOCIAL STABILITY: SOCIAL INSECURITY: DOES ANYONE TRY TO KEEP YOU FROM HAVING/CONTACTING OTHER FRIENDS OR DOING THINGS OUTSIDE YOUR HOME?: NO

## 2023-12-13 SDOH — SOCIAL STABILITY: SOCIAL INSECURITY: ABUSE: ADULT

## 2023-12-13 SDOH — SOCIAL STABILITY: SOCIAL INSECURITY: HAS ANYONE EVER THREATENED TO HURT YOUR FAMILY OR YOUR PETS?: NO

## 2023-12-13 SDOH — SOCIAL STABILITY: SOCIAL INSECURITY: WERE YOU ABLE TO COMPLETE ALL THE BEHAVIORAL HEALTH SCREENINGS?: YES

## 2023-12-13 SDOH — SOCIAL STABILITY: SOCIAL INSECURITY: ARE THERE ANY APPARENT SIGNS OF INJURIES/BEHAVIORS THAT COULD BE RELATED TO ABUSE/NEGLECT?: NO

## 2023-12-13 ASSESSMENT — COGNITIVE AND FUNCTIONAL STATUS - GENERAL
CLIMB 3 TO 5 STEPS WITH RAILING: A LOT
TOILETING: A LITTLE
DRESSING REGULAR UPPER BODY CLOTHING: A LITTLE
PATIENT BASELINE BEDBOUND: NO
MOVING TO AND FROM BED TO CHAIR: A LOT
DRESSING REGULAR LOWER BODY CLOTHING: A LOT
CLIMB 3 TO 5 STEPS WITH RAILING: A LOT
DAILY ACTIVITIY SCORE: 15
DRESSING REGULAR LOWER BODY CLOTHING: A LITTLE
TOILETING: A LOT
TURNING FROM BACK TO SIDE WHILE IN FLAT BAD: A LITTLE
MOVING TO AND FROM BED TO CHAIR: A LITTLE
MOVING FROM LYING ON BACK TO SITTING ON SIDE OF FLAT BED WITH BEDRAILS: A LITTLE
MOVING FROM LYING ON BACK TO SITTING ON SIDE OF FLAT BED WITH BEDRAILS: A LITTLE
PERSONAL GROOMING: A LITTLE
STANDING UP FROM CHAIR USING ARMS: A LITTLE
STANDING UP FROM CHAIR USING ARMS: A LITTLE
WALKING IN HOSPITAL ROOM: A LOT
HELP NEEDED FOR BATHING: A LITTLE
HELP NEEDED FOR BATHING: A LOT
MOBILITY SCORE: 16
WALKING IN HOSPITAL ROOM: A LOT
DRESSING REGULAR UPPER BODY CLOTHING: A LOT
MOBILITY SCORE: 15
EATING MEALS: A LITTLE
TURNING FROM BACK TO SIDE WHILE IN FLAT BAD: A LITTLE
DAILY ACTIVITIY SCORE: 19

## 2023-12-13 ASSESSMENT — ACTIVITIES OF DAILY LIVING (ADL)
WALKS IN HOME: INDEPENDENT
HEARING - LEFT EAR: FUNCTIONAL
GROOMING: INDEPENDENT
PATIENT'S MEMORY ADEQUATE TO SAFELY COMPLETE DAILY ACTIVITIES?: YES
HEARING - RIGHT EAR: FUNCTIONAL
DRESSING YOURSELF: INDEPENDENT
TOILETING: INDEPENDENT
ADEQUATE_TO_COMPLETE_ADL: YES
BATHING: INDEPENDENT
FEEDING YOURSELF: INDEPENDENT
JUDGMENT_ADEQUATE_SAFELY_COMPLETE_DAILY_ACTIVITIES: YES
LACK_OF_TRANSPORTATION: YES
ASSISTIVE_DEVICE: WALKER;EYEGLASSES

## 2023-12-13 ASSESSMENT — ENCOUNTER SYMPTOMS
HEMATOLOGIC/LYMPHATIC NEGATIVE: 1
ABDOMINAL PAIN: 1
EYES NEGATIVE: 1
CONSTITUTIONAL NEGATIVE: 1
MUSCULOSKELETAL NEGATIVE: 1
ALLERGIC/IMMUNOLOGIC NEGATIVE: 1
PSYCHIATRIC NEGATIVE: 1
NEUROLOGICAL NEGATIVE: 1
RESPIRATORY NEGATIVE: 1
ENDOCRINE NEGATIVE: 1
CARDIOVASCULAR NEGATIVE: 1

## 2023-12-13 ASSESSMENT — PAIN SCALES - GENERAL
PAINLEVEL_OUTOF10: 2
PAINLEVEL_OUTOF10: 0 - NO PAIN
PAINLEVEL_OUTOF10: 2
PAINLEVEL_OUTOF10: 4
PAINLEVEL_OUTOF10: 0 - NO PAIN

## 2023-12-13 ASSESSMENT — LIFESTYLE VARIABLES
AUDIT-C TOTAL SCORE: 0
SKIP TO QUESTIONS 9-10: 1
HOW OFTEN DO YOU HAVE 6 OR MORE DRINKS ON ONE OCCASION: NEVER
HOW MANY STANDARD DRINKS CONTAINING ALCOHOL DO YOU HAVE ON A TYPICAL DAY: PATIENT DOES NOT DRINK
HOW OFTEN DO YOU HAVE A DRINK CONTAINING ALCOHOL: NEVER
AUDIT-C TOTAL SCORE: 0

## 2023-12-13 ASSESSMENT — PATIENT HEALTH QUESTIONNAIRE - PHQ9
SUM OF ALL RESPONSES TO PHQ9 QUESTIONS 1 & 2: 4
1. LITTLE INTEREST OR PLEASURE IN DOING THINGS: SEVERAL DAYS
2. FEELING DOWN, DEPRESSED OR HOPELESS: NEARLY EVERY DAY

## 2023-12-13 ASSESSMENT — PAIN - FUNCTIONAL ASSESSMENT
PAIN_FUNCTIONAL_ASSESSMENT: 0-10

## 2023-12-13 ASSESSMENT — PAIN DESCRIPTION - LOCATION: LOCATION: ABDOMEN

## 2023-12-13 ASSESSMENT — PAIN DESCRIPTION - DESCRIPTORS: DESCRIPTORS: SORE

## 2023-12-13 NOTE — ED NOTES
Pt assisted to the restroom. Pt has an unsteady gait. Pt educated to call nurse before attempting ambulation.      Niecy Castaneda RN  12/13/23 1482

## 2023-12-13 NOTE — ED PROVIDER NOTES
Department of Emergency Medicine   ED  Provider Note  Admit Date/RoomTime: 12/12/2023  7:33 PM  ED Room: ST26/ST26        History of Present Illness:  Chief Complaint   Patient presents with    Leg Swelling    Weakness, Gen    Abdominal Pain     Pt presents to ED with abdominal pain, generalized weakness and L leg swelling. Pt states that her physician thinks it is a cellulitis on her leg. Pt states that she fell 4 weeks ago from the weakness and that it has been harder to walk.          Kassandra Warner is a 78 y.o. female history of hypertension, neuropathy, depression, cellulitis history presenting to the ED for frequent falls.  Family reports that she feels off balance, she was walking and fell 5 weeks ago causing him to fall on top of her.  Then went to sit down and lost her balance and fell a couple weeks ago.  She injured her knee both times.  Complains of pain and swelling.  Over the last week has noticed increased ankle swelling redness to the left leg and pain.  Went to urgent care was advised to come to the emergency department for evaluation of possible DVT.  Also endorses abdominal pain that started yesterday mid abdomen.  Incontinence to urine over the last 4 to 5 weeks progressively getting worse saw urology and had a scope with no known cause.  Denies nausea vomiting or diarrhea last bowel movement was yesterday normal no black tarry stools or blood in the stool.  Complains of urgency with urination.  No back pain or flank pain.  No blood in the urine or stool.  Complains of depression and increased sleeping since the death of her  5 months ago.,  Also complains of increased abdominal distention.  Family is concerned for diverticulitis    Review of Systems:   Pertinent positives and negatives are stated within HPI, all other systems reviewed and are negative.        --------------------------------------------- PAST HISTORY ---------------------------------------------  Past Medical History:   has a past medical history of Aortic stenosis, Breast cancer (CMS/HCC), COPD (chronic obstructive pulmonary disease) (CMS/HCC), Foot ulcer (CMS/HCC), Gallstones, Hiatal hernia, Hypertension, Left hip pain, Left knee pain, Liver cirrhosis (CMS/HCC), Low back pain, and Rosacea.  Past Surgical History:  has a past surgical history that includes Appendectomy; Replacement total knee oncologic (Right); Breast lumpectomy (Bilateral); Cataract extraction, bilateral; Liver biopsy; and Cholecystectomy.  Social History:  reports that she has never smoked. She has never been exposed to tobacco smoke. She has never used smokeless tobacco. She reports current alcohol use. She reports that she does not use drugs.  Family History: family history includes Breast cancer in her mother; Colon cancer in her father; Diabetes in her father; Heart attack in her brother; Heart disease in her brother and father; Hypertension in her father and mother.. Unless otherwise noted, family history is non contributory  The patient’s home medications have been reviewed.  Allergies: Fentanyl and Penicillins        ---------------------------------------------------PHYSICAL EXAM--------------------------------------    GENERAL APPEARANCE: Awake and alert.   VITAL SIGNS: As per the nurses' triage record.   HEENT: Normocephalic, atraumatic. Extraocular muscles are intact. Pupils equal round and reactive to light. Conjunctiva are pink. Negative scleral icterus. Mucous membranes are moist. Tongue in the midline. Pharynx was without erythema or exudates, uvula midline.  No raccoon eyes or ling signs noted.  No bite to the tongue or lip.  No epistaxis noted.  No contusions abrasions lacerations noted to the face or scalp.  NECK: Soft Nontender and supple, full gross ROM, no meningeal signs.  CHEST: Nontender to palpation. Clear to auscultation bilaterally. No rales, rhonchi, or wheezing.   HEART: S1, S2. Regular rate and rhythm. No murmurs, gallops or rubs.   Strong and equal pulses in the extremities.   ABDOMEN: Soft, diffuse tenderness increased tenderness in the left lower quadrant no rebound or guarding mild distention noted.  distended, positive bowel sounds, no palpable masses.  MUSCULCSKELETAL: Bilateral lower extremity edema +1 right leg +3 left leg.  Mild erythema noted to the anterior mid lower leg distal to the knee.  Not involving the ankle.  Warm to touch.  Peripheral pulses intact.  No calf tenderness with palpation.  Right lower extremity no calf tenderness noted no redness or warmth noted.  Moves upper extremities with difficulty.  NEUROLOGICAL: Awake, alert and oriented x 3. Power intact in the upper and lower extremities. Sensation is intact to light touch in the upper and lower extremities.   IMMUNOLOGICAL: No lymphatic streaking noted   DERM: No petechiae, rashes, or ecchymoses.  Psych: Flat affect        ------------------------- NURSING NOTES AND VITALS REVIEWED ---------------------------  The nursing notes within the ED encounter and vital signs as below have been reviewed by myself  /59   Pulse 82   Temp 36.8 °C (98.2 °F)   Resp 16   Ht 1.524 m (5')   Wt 63.5 kg (140 lb)   SpO2 97%   BMI 27.34 kg/m²     Oxygen Saturation Interpretation: Normal      The patient’s available past medical records and past encounters were reviewed.          -----------------------DIAGNOSTIC RESULTS------------------------  LABS:    Labs Reviewed   CBC WITH AUTO DIFFERENTIAL - Abnormal       Result Value    WBC 8.7      nRBC 0.0      RBC 4.02      Hemoglobin 11.6 (*)     Hematocrit 35.3 (*)     MCV 88      MCH 28.9      MCHC 32.9      RDW 16.3 (*)     Platelets 171      Neutrophils % 78.9      Immature Granulocytes %, Automated 0.3      Lymphocytes % 8.4      Monocytes % 11.1      Eosinophils % 1.0      Basophils % 0.3      Neutrophils Absolute 6.83 (*)     Immature Granulocytes Absolute, Automated 0.03      Lymphocytes Absolute 0.73 (*)     Monocytes  Absolute 0.96 (*)     Eosinophils Absolute 0.09      Basophils Absolute 0.03     COMPREHENSIVE METABOLIC PANEL - Abnormal    Glucose 136 (*)     Sodium 134      Potassium 3.1 (*)     Chloride 100      Bicarbonate 20 (*)     Urea Nitrogen 47 (*)     Creatinine 2.40 (*)     eGFR 20 (*)     Calcium 9.5      Albumin 3.7      Alkaline Phosphatase 254 (*)     Total Protein 6.8      AST 88 (*)     Bilirubin, Total 2.1 (*)     ALT 35      Anion Gap 14     SERIAL TROPONIN, INITIAL ([facility]) - Abnormal    Troponin T, High Sensitivity 27 (*)    LIPASE - Normal    Lipase 37     N-TERMINAL PROBNP - Normal    PROBNP 216      Narrative:     Reference ranges are based on clinical submission data. These ranges represent the 95th percentile of normal cut-off points. As NT Pro- BNP values approach 1000 pg/ml, clinical symptoms are more likely associated with CHF.   MAGNESIUM - Normal    Magnesium 2.10     URINALYSIS WITH REFLEX MICROSCOPIC   TROPONIN T SERIES, HIGH SENSITIVITY (0, 2 HR, 6 HR)    Narrative:     The following orders were created for panel order Troponin T Series, High Sensitivity (0, 2HR, 6HR).  Procedure                               Abnormality         Status                     ---------                               -----------         ------                     Serial Troponin, Initial...[[id number]]  Abnormal            Final result               Serial Troponin, 2 Hour ...[[id number]]                                                   Please view results for these tests on the individual orders.   SERIAL TROPONIN,  2 HOUR ([facility])       As interpreted by me, the above displayed labs are abnormal. All other labs obtained during this visit were within normal range or not returned as of this dictation.      EKG per attending note.        Lower extremity venous duplex left   Final Result   No evidence of deep vein thrombosis.        Signed by: Rickey Flores 12/12/2023 9:25 PM   Dictation workstation:   [id number]      XR  chest 2 views   Final Result   No focal airspace disease with coarse vascular markings and very mild   central vascular congestion with left ventricular enlargement   unchanged from previous. No focal airspace disease.        Signed by: Rickey Flores 12/12/2023 9:09 PM   Dictation workstation:   [id number]      XR knee left 4+ views   Final Result   Osteoarthritis is severe in the patellofemoral joint more mild in the   mediolateral compartments with chondrocalcinosis noted. Extensive   subcutaneous edema is seen surrounding the knee. This is suggestive   of fluid overload though an injury may have this appearance.                  Signed by: Rickey Flores 12/12/2023 9:11 PM   Dictation workstation:   [id number]      CT head wo IV contrast    (Results Pending)   CT abdomen pelvis wo IV contrast    (Results Pending)           Lower extremity venous duplex left   Final Result   No evidence of deep vein thrombosis.        Signed by: Rickey Flores 12/12/2023 9:25 PM   Dictation workstation:   [id number]      XR chest 2 views   Final Result   No focal airspace disease with coarse vascular markings and very mild   central vascular congestion with left ventricular enlargement   unchanged from previous. No focal airspace disease.        Signed by: Rickye Flores 12/12/2023 9:09 PM   Dictation workstation:   [id number]      XR knee left 4+ views   Final Result   Osteoarthritis is severe in the patellofemoral joint more mild in the   mediolateral compartments with chondrocalcinosis noted. Extensive   subcutaneous edema is seen surrounding the knee. This is suggestive   of fluid overload though an injury may have this appearance.                  Signed by: Rickey Flores 12/12/2023 9:11 PM   Dictation workstation:   [id number]      CT head wo IV contrast    (Results Pending)   CT abdomen pelvis wo IV contrast    (Results Pending)           ------------------------------ ED COURSE/MEDICAL DECISION  MAKING----------------------  Medical Decision Making:   Exam: A medically appropriate exam performed, outlined above, given the known history and presentation.    History obtained from: Review of medical record patient and family      Social Determinants of Health considered during this visit: Lives at home      PAST MEDICAL HISTORY/Chronic Conditions Affecting Care     has a past medical history of Aortic stenosis, Breast cancer (CMS/HCC), COPD (chronic obstructive pulmonary disease) (CMS/HCC), Foot ulcer (CMS/HCC), Gallstones, Hiatal hernia, Hypertension, Left hip pain, Left knee pain, Liver cirrhosis (CMS/HCC), Low back pain, and Rosacea.       CC/HPI Summary, Social Determinants of health, Records Reviewed, DDx, testing done/not done, ED Course, Reassessment, disposition considerations/shared decision making with patient, consults, disposition:   Presents with left leg swelling knee pain abdominal pain lightheadedness.  Seen evaluated in urgent care referred to the emergency department  Plan:   CT abdomen pelvis pending  CT head pending  X-ray chest pending  X-ray left knee pending  Doppler left leg pending  EKG  CBC  CMP  Lipase  proBNP  Troponin  Urine    Medical Decision Making/Differential Diagnosis:  Differentials include not limited to knee fracture left versus DVT versus cellulitis versus CHF versus electrode abnormality versus anemia versus dehydration versus depression versus diverticulitis versus obstruction versus UTI versus closed head injury versus intercranial bleed  Review:  Troponin 27  Lipase 37  proBNP 216  White blood cell count 8.7  Hemoglobin 11.6  Glucose 136  Potassium 3.1  Bicarb 20  BUN 47  Creatinine 2.4  Alkaline phosphatase 254  AST 88  Total bili 2.1  ALT 35  Patient presented with multiple complaints.  Workup is pending at this time for testing CT head and neck chest x-ray x-ray left knee Doppler study.  White blood cell count 8.7.  Urine pending.  Initial troponin elevated at 27.   EKG per attending note.  Mild anemia.  No signs of active bleeding.  Potassium noted to be low at 3.1 replaced in the emergency department.  Electrolyte imbalance noted.  Acute kidney injury.  LFTs are elevated as well.    Workup pending at this time.  Please see attending note for final impression disposition reevaluation.  Patient seen evaluated with attending physician Dr. Cooley    PROCEDURES  Unless otherwise noted below, none      CONSULTS:   None      ED Course as of 12/12/23 2135   Tue Dec 12, 2023   2120 EKG: Sinus rhythm with PAC present.  Rate is 90 bpm.  Left axis deviation.  QTc is 504 ms which is slightly prolonged, PA interval 180.  No ST elevation or depression, no acute ischemic pattern.  No STEMI. [NT]      ED Course User Index  [NT] Aguila Cooley DO         Diagnoses as of 12/12/23 2135   Acute kidney injury (CMS/HCC)   Hypokalemia   Abdominal pain, unspecified abdominal location   Acute pain of left knee   Lower extremity edema   Left leg cellulitis   Transaminasemia         This patient has remained hemodynamically stable during their ED course.      Critical Care: none        Counseling:  The emergency provider has spoken with the patient and family  and discussed today’s results, in addition to providing specific details for the plan of care and counseling regarding the diagnosis and prognosis.  Questions are answered at this time and they are agreeable with the plan.         --------------------------------- IMPRESSION AND DISPOSITION ---------------------------------    IMPRESSION  1. Acute kidney injury (CMS/HCC)    2. Hypokalemia    3. Abdominal pain, unspecified abdominal location    4. Acute pain of left knee    5. Lower extremity edema    6. Left leg cellulitis    7. Transaminasemia        DISPOSITION  Disposition: Pending  Patient condition is stable        NOTE: This report was transcribed using voice recognition software. Every effort was made to ensure accuracy; however,  inadvertent computerized transcription errors may be present      SPRING Gonzales  12/12/23 2114       SPRING Gonzales  12/12/23 2130

## 2023-12-13 NOTE — PROGRESS NOTES
Kassandra Warner is a 78 y.o. female on day 0 of admission presenting with Acute kidney injury (CMS/HCC).      Subjective   Pt c/o LE edema, mostly Rt-sided, knee pain.  Positive for some abdominal pain as well.       Objective     Last Recorded Vitals  /51   Pulse 66   Temp 36.8 °C (98.2 °F)   Resp 20   Wt 63.5 kg (140 lb)   SpO2 94%   Intake/Output last 3 Shifts:  No intake or output data in the 24 hours ending 12/13/23 1516    Admission Weight  Weight: 63.5 kg (140 lb) (12/12/23 2000)    Daily Weight  12/12/23 : 63.5 kg (140 lb)    Image Results  US guided abdominal paracentesis  Narrative: Interpreted By:  Loli Hong,   STUDY:  US GUIDED ABDOMINAL PARACENTESIS; 12/13/2023 12:12 pm      INDICATION:  Signs/Symptoms:Ascites.      COMPARISON:  CT scan dated 12/12/2023      ACCESSION NUMBER(S):  [id number]      ORDERING CLINICIAN:  RENEE VILLANUEVA      TECHNIQUE:  Ultrasound-guided paracentesis      FINDINGS:  Informed consent obtained. Patient positioned supine. Left lower  quadrant prepped, draped and anesthetized. Under ultrasound guidance,  8 Angolan centesis sheath needle inserted into peritoneal cavity. 100  mLof clear yellowfluid aspiratedwith sample sent for analysis.  Patient tolerated procedure well      Impression: Ultrasound-guided diagnostic paracentesis.      Signed by: Loli Hong 12/13/2023 1:47 PM  Dictation workstation:   [id number]  ECG 12 lead  Sinus rhythm with Premature atrial complexes  Left axis deviation  Low voltage QRS  Inferior infarct , age undetermined  Possible Anterolateral infarct , age undetermined  Prolonged QT  Abnormal ECG  No previous ECGs available      Physical Exam  Seen and examined in ED, room 4  Patient is alert, cooperative with exam.  She is in no apparent respiratory distress.  Skin: There is erythema with some induration of the skin of the distal left lower extremity.  No open wounds.  Lungs are diminished bilaterally.  No wheezes, rales,  rhonchi.  Heart: Regular rate with systolic murmur.  Abdomen is increased in size with ascites.  Mildly tender diffusely.  Bowel sounds positive.  Patient moves all extremities.  Relevant Results             Results for orders placed or performed during the hospital encounter of 12/12/23 (from the past 24 hour(s))   ECG 12 lead   Result Value Ref Range    Ventricular Rate 90 BPM    Atrial Rate 90 BPM    DE Interval 180 ms    QRS Duration 100 ms    QT Interval 412 ms    QTC Calculation(Bazett) 504 ms    P Axis -5 degrees    R Axis -59 degrees    T Axis 60 degrees    QRS Count 14 beats    Q Onset 215 ms    P Onset 125 ms    P Offset 183 ms    T Offset 421 ms    QTC Fredericia 471 ms   CBC and Auto Differential   Result Value Ref Range    WBC 8.7 4.4 - 11.3 x10*3/uL    nRBC 0.0 0.0 - 0.0 /100 WBCs    RBC 4.02 4.00 - 5.20 x10*6/uL    Hemoglobin 11.6 (L) 12.0 - 16.0 g/dL    Hematocrit 35.3 (L) 36.0 - 46.0 %    MCV 88 80 - 100 fL    MCH 28.9 26.0 - 34.0 pg    MCHC 32.9 32.0 - 36.0 g/dL    RDW 16.3 (H) 11.5 - 14.5 %    Platelets 171 150 - 450 x10*3/uL    Neutrophils % 78.9 40.0 - 80.0 %    Immature Granulocytes %, Automated 0.3 0.0 - 0.9 %    Lymphocytes % 8.4 13.0 - 44.0 %    Monocytes % 11.1 2.0 - 10.0 %    Eosinophils % 1.0 0.0 - 6.0 %    Basophils % 0.3 0.0 - 2.0 %    Neutrophils Absolute 6.83 (H) 1.60 - 5.50 x10*3/uL    Immature Granulocytes Absolute, Automated 0.03 0.00 - 0.50 x10*3/uL    Lymphocytes Absolute 0.73 (L) 0.80 - 3.00 x10*3/uL    Monocytes Absolute 0.96 (H) 0.05 - 0.80 x10*3/uL    Eosinophils Absolute 0.09 0.00 - 0.40 x10*3/uL    Basophils Absolute 0.03 0.00 - 0.10 x10*3/uL   Comprehensive metabolic panel   Result Value Ref Range    Glucose 136 (H) 65 - 99 mg/dL    Sodium 134 133 - 145 mmol/L    Potassium 3.1 (L) 3.4 - 5.1 mmol/L    Chloride 100 97 - 107 mmol/L    Bicarbonate 20 (L) 24 - 31 mmol/L    Urea Nitrogen 47 (H) 8 - 25 mg/dL    Creatinine 2.40 (H) 0.40 - 1.60 mg/dL    eGFR 20 (L) >60  mL/min/1.73m*2    Calcium 9.5 8.5 - 10.4 mg/dL    Albumin 3.7 3.5 - 5.0 g/dL    Alkaline Phosphatase 254 (H) 35 - 125 U/L    Total Protein 6.8 5.9 - 7.9 g/dL    AST 88 (H) 5 - 40 U/L    Bilirubin, Total 2.1 (H) 0.1 - 1.2 mg/dL    ALT 35 5 - 40 U/L    Anion Gap 14 <=19 mmol/L   Lipase   Result Value Ref Range    Lipase 37 16 - 63 U/L   NT Pro-BNP   Result Value Ref Range    PROBNP 216 0 - 624 pg/mL   Serial Troponin, Initial ([facility])   Result Value Ref Range    Troponin T, High Sensitivity 27 (H) <=15 ng/L   Magnesium   Result Value Ref Range    Magnesium 2.10 1.60 - 3.10 mg/dL   Serial Troponin, 6 Hour ([facility])   Result Value Ref Range    Troponin T, High Sensitivity 26 (H) <=15 ng/L   Sars-CoV-2 and Influenza A/B PCR   Result Value Ref Range    Flu A Result Not Detected Not Detected    Flu B Result Not Detected Not Detected    Coronavirus 2019, PCR Not Detected Not Detected   Urinalysis with Reflex Microscopic   Result Value Ref Range    Color, Urine Yellow Light-Yellow, Yellow, Dark-Yellow    Appearance, Urine Clear Clear    Specific Gravity, Urine 1.012 1.005 - 1.035    pH, Urine 5.5 5.0, 5.5, 6.0, 6.5, 7.0, 7.5, 8.0    Protein, Urine 10 (TRACE) NEGATIVE, 10 (TRACE), 20 (TRACE) mg/dL    Glucose, Urine Normal Normal mg/dL    Blood, Urine NEGATIVE NEGATIVE    Ketones, Urine NEGATIVE NEGATIVE mg/dL    Bilirubin, Urine NEGATIVE NEGATIVE    Urobilinogen, Urine Normal Normal mg/dL    Nitrite, Urine NEGATIVE NEGATIVE    Leukocyte Esterase, Urine NEGATIVE NEGATIVE   Alcohol   Result Value Ref Range    Alcohol <0.010 0.000 - 0.010 g/dL   Ammonia   Result Value Ref Range    Ammonia 80 (H) 12 - 45 umol/L   Microscopic Only, Urine   Result Value Ref Range    WBC, Urine 1-5 1-5, NONE /HPF    RBC, Urine 1-2 NONE, 1-2, 3-5 /HPF    Squamous Epithelial Cells, Urine 1-9 (SPARSE) Reference range not established. /HPF    Transitional Epithelial Cells, Urine 1-2 (FEW) Reference range not established. /HPF    Mucus, Urine FEW Reference  range not established. /LPF    Hyaline Casts, Urine 4+ (A) NONE /LPF   Comprehensive Metabolic Panel   Result Value Ref Range    Glucose 97 65 - 99 mg/dL    Sodium 136 133 - 145 mmol/L    Potassium 3.9 3.4 - 5.1 mmol/L    Chloride 104 97 - 107 mmol/L    Bicarbonate 18 (L) 24 - 31 mmol/L    Urea Nitrogen 47 (H) 8 - 25 mg/dL    Creatinine 2.10 (H) 0.40 - 1.60 mg/dL    eGFR 24 (L) >60 mL/min/1.73m*2    Calcium 9.4 8.5 - 10.4 mg/dL    Albumin 3.3 (L) 3.5 - 5.0 g/dL    Alkaline Phosphatase 249 (H) 35 - 125 U/L    Total Protein 6.3 5.9 - 7.9 g/dL    AST 85 (H) 5 - 40 U/L    Bilirubin, Total 1.9 (H) 0.1 - 1.2 mg/dL    ALT 33 5 - 40 U/L    Anion Gap 14 <=19 mmol/L   Protime-INR   Result Value Ref Range    Protime 11.8 9.3 - 12.7 seconds    INR 1.1 0.9 - 1.2   aPTT   Result Value Ref Range    aPTT 27.7 22.0 - 32.5 seconds   CBC   Result Value Ref Range    WBC 7.0 4.4 - 11.3 x10*3/uL    nRBC 0.0 0.0 - 0.0 /100 WBCs    RBC 3.80 (L) 4.00 - 5.20 x10*6/uL    Hemoglobin 11.1 (L) 12.0 - 16.0 g/dL    Hematocrit 33.1 (L) 36.0 - 46.0 %    MCV 87 80 - 100 fL    MCH 29.2 26.0 - 34.0 pg    MCHC 33.5 32.0 - 36.0 g/dL    RDW 16.3 (H) 11.5 - 14.5 %    Platelets 132 (L) 150 - 450 x10*3/uL   AFP Tumor Marker   Result Value Ref Range    Alpha-Fetoprotein 32 (H) 0 - 9 ng/mL   Hepatitis panel, acute   Result Value Ref Range    Hepatitis B Surface AG Nonreactive Nonreactive    Hepatitis A  AB- IgM Nonreactive Nonreactive    Hepatitis B Core AB; IgM Nonreactive Nonreactive    Hepatitis C AB Nonreactive Nonreactive   Body Fluid Cell Count   Result Value Ref Range    Color, Fluid Yellow Colorless, Straw, Yellow    Clarity, Fluid Clear Clear    WBC, Fluid 311 See Comment /uL    RBC, Fluid 2,000 0  /uL /uL   Body Fluid Differential   Result Value Ref Range    Neutrophils %, Manual, Fluid 6 <25 % %    Lymphocytes %, Manual, Fluid 42 <75 % %    Mono/Macrophages %, Manual, Fluid 52 <70 % %    Eosinophils %, Manual, Fluid 0 0 % %    Basophils %, Manual,  Fluid 0 0 % %    Immature Granulocytes %, Manual, Fluid 0 0 % %    Blasts %, Manual, Fluid 0 0 % %    Unclassified Cells %, Manual, Fluid 0 0 % %    Plasma Cells %, Manual, Fluid 0 0 % %    Total Cells Counted, Fluid 100     US guided abdominal paracentesis    Result Date: 12/13/2023  Interpreted By:  Loli Hong, STUDY: US GUIDED ABDOMINAL PARACENTESIS; 12/13/2023 12:12 pm   INDICATION: Signs/Symptoms:Ascites.   COMPARISON: CT scan dated 12/12/2023   ACCESSION NUMBER(S): [id number]   ORDERING CLINICIAN: RENEE VILLANUEVA   TECHNIQUE: Ultrasound-guided paracentesis   FINDINGS: Informed consent obtained. Patient positioned supine. Left lower quadrant prepped, draped and anesthetized. Under ultrasound guidance, 8 Vietnamese centesis sheath needle inserted into peritoneal cavity. 100 mLof clear yellowfluid aspiratedwith sample sent for analysis. Patient tolerated procedure well       Ultrasound-guided diagnostic paracentesis.   Signed by: Loli Hong 12/13/2023 1:47 PM Dictation workstation:   [id number]    ECG 12 lead    Result Date: 12/13/2023  Sinus rhythm with Premature atrial complexes Left axis deviation Low voltage QRS Inferior infarct , age undetermined Possible Anterolateral infarct , age undetermined Prolonged QT Abnormal ECG No previous ECGs available    CT abdomen pelvis wo IV contrast    Result Date: 12/12/2023  Interpreted By:  Rickey Flores, STUDY: CT ABDOMEN PELVIS WO IV CONTRAST; 12/12/2023 9:39 pm   INDICATION: Signs/Symptoms:Abdominal pain.   COMPARISON: 19 September 2023   ACCESSION NUMBER(S): [id number]   ORDERING CLINICIAN: JERSEY DUPREE   TECHNIQUE: CT of the Abdomen and Pelvis was performed. Axial, sagittal and coronal reformatted images were reviewed.   PROCEDURE: Helical CT images were obtained through the abdomen and pelvis at 3 mm collimation.   CT was performed with one or more of the following dose reduction techniques: automated exposure control, adjustment of the mA and/or kV  according to patient size, or use of iterative reconstruction technique.   FINDINGS: Chest: Mild dependent atelectasis without infiltrate or effusion. Calcified aortic valve and mitral valve demonstrated.   Esophagus, stomach, duodenum and small bowel: Distal esophagus is slightly patulous with small hiatal hernia. The stomach is incompletely distended but unremarkable. The duodenum is without abnormality. No dilated small bowel loops seen.   Pancreas:  There is atrophy of the pancreas without focal lesion. No cyst or mass in the pancreas.   Liver: Nodular surface contour with tiny subcentimeter low-attenuation lesion anterior segment right hepatic lobe likely representing cysts but too small to further characterize seen. Ascites surrounds the liver.   Gallbladder and bile ducts: Post cholecystectomy without choledocholithiasis or intra or extrahepatic ductal dilatation.   Adrenal Glands: No abnormality of either adrenal.   Spleen: Splenomegaly demonstrated with tortuous dilated splenic vein. Ascites is seen adjacent to the spleen.   Kidneys and ureters: No obstructive uropathy. No nephrolithiasis. No ureterolithiasis.   Urinary Bladder: Beam hardening artifact from the left hip arthroplasty obscures the incompletely urine distended bladder but no focal abnormality of the bladder demonstrated.   Appendix: No CT evidence of appendicitis.   Large Bowel: Mild ascending, very mild transverse and mild descending and rectosigmoid colonic stool burden. No stercoral ulcer or focal thickening of the walls of large bowel. There is extensive distal descending to sigmoid diverticulosis without focal diverticulitis with a caliber change that may be due to peristalsis without wall thickening of the mid sigmoid colon from air distension to a narrowed lumen but again without wall thickening.   Intraperitoneal space: Ascites is seen in the pelvis and each colonic gutter as well as surrounding the liver and spleen consistent with  sequelae of cirrhosis.   Vasculature: Moderate atherosclerotic disease of the nondilated abdominal aorta and iliac vessels is again seen.   Lymph Nodes: No inguinal, pelvic sidewall, retroperitoneal or mesenteric adenopathy with small lymph node seen amongst the mild central ascites.   Reproductive Organs: Calcified fibroid seen. Adnexa are unremarkable.   Bones/Joints: Discogenic degenerative disease with vacuum phenomenon multiple levels of the lower thoracic and lumbar spine with disc height loss without wedge compression or compression injury. Artifact from left hip arthroplasty.   Soft Tissues: Thin abdominal wall without abdominal wall defect. Levoscoliosis of the lumbar spine. Severe degenerative change of the right hip without acute osseous abnormality. Left hip arthroplasty is incompletely seen with the portions visualized appear intact given the artifacts. There is diffuse mild to moderate anasarca.       1. Cirrhosis with ascites and anasarca now seen compared to the prior exam. 2. Colonic diverticulosis without diverticulitis. No obstruction or ileus. No enteritis or colitis. No stercoral ulcer with much less stool throughout the large bowel compared to the prior study. Much less stool in the rectum compared to the prior study. 3. Small hiatal hernia. 4. No obstructive uropathy. 5. No choledocholithiasis or ductal dilatation. 6. No adenopathy throughout the examination.         This report has been produced using speech recognition. This exam is available in DICOM format to non-affiliated healthcare facilities on a secure media free searchable basis with prior patient authorization. The patient exposure is reported to a radiation dose index registry. All CT examinations are performed with one or more of the following dose reduction techniques: Automated Exposure Control, Adjustment of mA and/or KV according to patient size, or use of iterative reconstruction techniques.   Signed by: Rickey Flores  12/12/2023 9:58 PM Dictation workstation:   [id number]    CT head wo IV contrast    Result Date: 12/12/2023  Interpreted By:  Rickey Flores, STUDY: CT HEAD WO IV CONTRAST; 12/12/2023 9:38 pm   INDICATION: fall /  felling off balance.   COMPARISON: 8 August 2020.   ACCESSION NUMBER(S): [id number]   ORDERING CLINICIAN: JERSEY DUPREE   TECHNIQUE: CT was performed with one or more of the following dose reduction techniques: automated exposure control, adjustment of the mA and/or kV according to patient size, or use of iterative reconstruction technique.       PROCEDURE: 3.0 mm axial images were obtained through the brain, to include the posterior fossa without intravenous contrast enhancement.   FINDINGS: There is symmetric volume loss of the cerebral hemispheres. Very mild decreased attenuation in the bilateral periventricular white matter, consistent with microangiopathy is noted.  There is no encephalomalacic change. Brainstem is unremarkable. Cerebellar hemispheres are symmetric. No intra- or extra-axial mass or abnormal blood products are demonstrated.   The paranasal sinuses and mastoids as well as calvarium are unremarkable. Calvarium is unremarkable. Soft tissues are unremarkable. No posttraumatic abnormality demonstrated.       1. No acute intracranial findings. 2. Symmetric volume loss of the cerebral hemispheres. 3. Periventricular Microangiopathy. 4. No posttraumatic abnormality.   This report has been produced using speech recognition. This exam is available in DICOM format to non-affiliated healthcare facilities on a secure media free searchable basis with prior patient authorization. The patient exposure is reported to a radiation dose index registry. All CT examinations are performed with one or more of the following dose reduction techniques: Automated Exposure Control, Adjustment of mA and/or KV according to patient size, or use of iterative reconstruction techniques.   MACRO: None   Signed by: Rickey  Mark 12/12/2023 9:49 PM Dictation workstation:   [id number]    Lower extremity venous duplex left    Result Date: 12/12/2023  Interpreted By:  Rickey Flores, STUDY: [facility] LOWER EXTREMITY VENOUS DUPLEX LEFT; 12/12/2023; 12/12/2023 9:18 pm   INDICATION: Signs/Symptoms:pain swelling.   ACCESSION NUMBER(S): [id number]   ORDERING CLINICIAN: JERSEY DUPREE   COMPARISON: 9 August 2020.   TECHNIQUE: PROCEDURE: Grayscale, 2D, color Doppler, Doppler spectral analysis (waveform) and duplex images obtained.   FINDINGS: Patent with normal flow, and compressibility of the common and superficial femoral, popliteal, and greater saphenous veins  as well as in the tibioperoneal trunk and trifurcation.  There is no subcutaneous edema.       No evidence of deep vein thrombosis.   Signed by: Rickey Flores 12/12/2023 9:25 PM Dictation workstation:   [id number]    XR knee left 4+ views    Result Date: 12/12/2023  Interpreted By:  Rickey Flores, STUDY: XR KNEE LEFT 4+ VIEWS; 12/12/2023 8:54 pm   INDICATION: Signs/Symptoms:pain  swelling.   COMPARISON: 21 July 2022.   ACCESSION NUMBER(S): [id number]   ORDERING CLINICIAN: JERSEY DUPREE   TECHNIQUE: AP, lateral tunnel and oblique views of the left knee were performed.   FINDINGS: There is diffuse subcutaneous edema. Chondrocalcinosis in the mediolateral compartment seen with very mild joint space narrowing. Severe patellofemoral osteoarthritis and joint space narrowing with sclerosis and osteophytosis seen. Trace suprapatellar effusion is seen. Atherosclerotic disease noted. No acute fracture or dislocation. No periostitis.       Osteoarthritis is severe in the patellofemoral joint more mild in the mediolateral compartments with chondrocalcinosis noted. Extensive subcutaneous edema is seen surrounding the knee. This is suggestive of fluid overload though an injury may have this appearance.       Signed by: Rickey Flores 12/12/2023 9:11 PM Dictation workstation:   [id number]    XR chest 2  views    Result Date: 12/12/2023  Interpreted By:  Rickey Flores, STUDY: XR CHEST 2 VIEWS; 12/12/2023 8:54 pm   INDICATION: Signs/Symptoms:dizziness.   COMPARISON: 13 August 2022   ACCESSION NUMBER(S): [id number]   ORDERING CLINICIAN: JERSEY DUPREE   TECHNIQUE: AP erect view of the chest were performed.   FINDINGS: The left lung is adequately inflated. Elevation of the right hemidiaphragm is again seen  cardiomegaly with greater left ventricular enlargement than the other chambers is unchanged. There is very mild central vascular congestion with coarse vascular markings.       No focal airspace disease with coarse vascular markings and very mild central vascular congestion with left ventricular enlargement unchanged from previous. No focal airspace disease.   Signed by: Rickey Flores 12/12/2023 9:09 PM Dictation workstation:   [id number]    Assessment/Plan                  Principal Problem:    Acute kidney injury (CMS/HCC)  Active Problems:    Hypokalemia    Lower extremity edema    Transaminasemia    Ascites    Acute kidney failure, possible hepatorenal syndrome.  Renal function is a little bit better today.  Followed by nephrologist  Liver cirrhosis due to alcohol abuse in the past, patient is sober since 2020.  Ascites  Spontaneous bacterial peritonitis.  Ascitic fluid is consistent with SBP due to elevated white count.  Started on cefepime  Left lower extremity cellulitis.  Change antibiotic to cefepime.  Time spent with patient 30 minutes.              Meghan William MD

## 2023-12-13 NOTE — H&P
History Of Present Illness  Kassandra Warner is a 78 y.o. female presenting with history of falls, pain in the left knee.    Reports 2 falls in the last few weeks.  She had a fall 4 to 5 weeks ago when she apparently tripped.  Her son was with her and unsuccessfully attempted to prevent her from falling.  She had another fall about 2 weeks later while in the kitchen of her home.  She did not lose consciousness.  She was unable to get up and called her son who came to help her.  She says she came to the emergency room because of pain in the left knee resulting from the falls.  During workup, she was found to have an elevated serum creatinine of 2.4, up from 1.2 in 9/2023.  A CT scan of the abdomen pelvis showed ascites which appears to be new and cirrhosis of the liver.  The patient has a history of alcohol use and stopped drinking in 2020 per her report.  Upon further review of medical records, she was reported to have cirrhosis of the liver when admitted to the hospital in August 2020.  An EGD on 8/4/2020 showed grade 1 esophageal varices.  The patient reports that he had been sleeping excessively.  She lost her  4 months ago and thinks that she is depressed.  She is admitted for further evaluation of acute kidney failure and ascites    Past Medical History  Past Medical History:   Diagnosis Date    Aortic stenosis     Breast cancer (CMS/HCC)     COPD (chronic obstructive pulmonary disease) (CMS/HCC)     Foot ulcer (CMS/HCC)     Gallstones     Hiatal hernia     Hypertension     Left hip pain     Left knee pain     Liver cirrhosis (CMS/HCC)     Low back pain     Rosacea        Surgical History  Past Surgical History:   Procedure Laterality Date    APPENDECTOMY      BREAST LUMPECTOMY Bilateral     CATARACT EXTRACTION, BILATERAL      CHOLECYSTECTOMY      LIVER BIOPSY      TOTAL KNEE ARTHROPLASTY Right         Social History  She reports that she has never smoked. She has never been exposed to tobacco smoke. She  has never used smokeless tobacco. She reports that she does not currently use alcohol. She reports that she does not use drugs.    Family History  Family History   Problem Relation Name Age of Onset    Breast cancer Mother      Hypertension Mother      Colon cancer Father      Diabetes Father      Heart disease Father      Hypertension Father      Heart attack Brother          Cause of death    Heart disease Brother          Allergies  Fentanyl and Penicillins    Review of Systems   General: Negative for fever,  chills or fatigue.    HEENT: Negative for headache, blurring of vision or double vision.    Cardiovascular: Negative for chest pain, palpitations or orthopnea.    Respiratory: Negative for cough, shortness of breath or wheezing.    Gastrointestinal: Negative for nausea, vomiting, hematemesis, abdominal pain or diarrhea.   Genitourinary: Negative for dysuria, hematuria, frequency or nocturia.   Musculoskeletal: As in the HPI   Skin: Negative for rash, itching, or jaundice.   Hematologic: Negative for bleeding or bruising.   Neurologic: Negative for headache, loss of consciousness. syncope or seizures   Psychological:  As in the HPI       Physical Exam    General.: Awake alert well-developed, responsive   HEENT: Normocephalic, not icteric, not pale, no facial asymmetry, no pharyngeal erythema.   Neck: Supple, no carotid bruit, no thyroid enlargement.   Cardiovascular: Regular heart rate and rhythm normal S1 and S2 with a systolic ejection murmur.    Respiratory: Equal breath sounds bilaterally clear to auscultation.   Abdomen: Soft, nontender to palpation, distended, tympanitic to percussion, bowel sounds present and normoactive.   Extremities: Bilateral pitting pedal and leg edema extending to the thighs   Neurologic: Alert and oriented to self, place and date, muscle strength 5/5 in all extremities.   Dermatologic: No rash, ecchymosis, or jaundice.   Psychological: Appropriate affect and behavior.      Last  Recorded Vitals  Blood pressure 123/51, pulse 73, temperature 36.8 °C (98.2 °F), resp. rate 18, height 1.524 m (5'), weight 63.5 kg (140 lb), SpO2 95 %.    Relevant Results  Results for orders placed or performed during the hospital encounter of 12/12/23 (from the past 24 hour(s))   CBC and Auto Differential   Result Value Ref Range    WBC 8.7 4.4 - 11.3 x10*3/uL    nRBC 0.0 0.0 - 0.0 /100 WBCs    RBC 4.02 4.00 - 5.20 x10*6/uL    Hemoglobin 11.6 (L) 12.0 - 16.0 g/dL    Hematocrit 35.3 (L) 36.0 - 46.0 %    MCV 88 80 - 100 fL    MCH 28.9 26.0 - 34.0 pg    MCHC 32.9 32.0 - 36.0 g/dL    RDW 16.3 (H) 11.5 - 14.5 %    Platelets 171 150 - 450 x10*3/uL    Neutrophils % 78.9 40.0 - 80.0 %    Immature Granulocytes %, Automated 0.3 0.0 - 0.9 %    Lymphocytes % 8.4 13.0 - 44.0 %    Monocytes % 11.1 2.0 - 10.0 %    Eosinophils % 1.0 0.0 - 6.0 %    Basophils % 0.3 0.0 - 2.0 %    Neutrophils Absolute 6.83 (H) 1.60 - 5.50 x10*3/uL    Immature Granulocytes Absolute, Automated 0.03 0.00 - 0.50 x10*3/uL    Lymphocytes Absolute 0.73 (L) 0.80 - 3.00 x10*3/uL    Monocytes Absolute 0.96 (H) 0.05 - 0.80 x10*3/uL    Eosinophils Absolute 0.09 0.00 - 0.40 x10*3/uL    Basophils Absolute 0.03 0.00 - 0.10 x10*3/uL   Comprehensive metabolic panel   Result Value Ref Range    Glucose 136 (H) 65 - 99 mg/dL    Sodium 134 133 - 145 mmol/L    Potassium 3.1 (L) 3.4 - 5.1 mmol/L    Chloride 100 97 - 107 mmol/L    Bicarbonate 20 (L) 24 - 31 mmol/L    Urea Nitrogen 47 (H) 8 - 25 mg/dL    Creatinine 2.40 (H) 0.40 - 1.60 mg/dL    eGFR 20 (L) >60 mL/min/1.73m*2    Calcium 9.5 8.5 - 10.4 mg/dL    Albumin 3.7 3.5 - 5.0 g/dL    Alkaline Phosphatase 254 (H) 35 - 125 U/L    Total Protein 6.8 5.9 - 7.9 g/dL    AST 88 (H) 5 - 40 U/L    Bilirubin, Total 2.1 (H) 0.1 - 1.2 mg/dL    ALT 35 5 - 40 U/L    Anion Gap 14 <=19 mmol/L   Lipase   Result Value Ref Range    Lipase 37 16 - 63 U/L   NT Pro-BNP   Result Value Ref Range    PROBNP 216 0 - 624 pg/mL   Serial Troponin,  Initial ([facility])   Result Value Ref Range    Troponin T, High Sensitivity 27 (H) <=15 ng/L   Magnesium   Result Value Ref Range    Magnesium 2.10 1.60 - 3.10 mg/dL   Serial Troponin, 6 Hour ([facility])   Result Value Ref Range    Troponin T, High Sensitivity 26 (H) <=15 ng/L   Sars-CoV-2 and Influenza A/B PCR   Result Value Ref Range    Flu A Result Not Detected Not Detected    Flu B Result Not Detected Not Detected    Coronavirus 2019, PCR Not Detected Not Detected   Urinalysis with Reflex Microscopic   Result Value Ref Range    Color, Urine Yellow Light-Yellow, Yellow, Dark-Yellow    Appearance, Urine Clear Clear    Specific Gravity, Urine 1.012 1.005 - 1.035    pH, Urine 5.5 5.0, 5.5, 6.0, 6.5, 7.0, 7.5, 8.0    Protein, Urine 10 (TRACE) NEGATIVE, 10 (TRACE), 20 (TRACE) mg/dL    Glucose, Urine Normal Normal mg/dL    Blood, Urine NEGATIVE NEGATIVE    Ketones, Urine NEGATIVE NEGATIVE mg/dL    Bilirubin, Urine NEGATIVE NEGATIVE    Urobilinogen, Urine Normal Normal mg/dL    Nitrite, Urine NEGATIVE NEGATIVE    Leukocyte Esterase, Urine NEGATIVE NEGATIVE   Alcohol   Result Value Ref Range    Alcohol <0.010 0.000 - 0.010 g/dL   Ammonia   Result Value Ref Range    Ammonia 80 (H) 12 - 45 umol/L   Microscopic Only, Urine   Result Value Ref Range    WBC, Urine 1-5 1-5, NONE /HPF    RBC, Urine 1-2 NONE, 1-2, 3-5 /HPF    Squamous Epithelial Cells, Urine 1-9 (SPARSE) Reference range not established. /HPF    Transitional Epithelial Cells, Urine 1-2 (FEW) Reference range not established. /HPF    Mucus, Urine FEW Reference range not established. /LPF    Hyaline Casts, Urine 4+ (A) NONE /LPF     CT abdomen pelvis wo IV contrast    Result Date: 12/12/2023  Interpreted By:  Rickey Flores, STUDY: CT ABDOMEN PELVIS WO IV CONTRAST; 12/12/2023 9:39 pm   INDICATION: Signs/Symptoms:Abdominal pain.   COMPARISON: 19 September 2023   ACCESSION NUMBER(S): [id number]   ORDERING CLINICIAN: JERSEY DUPREE   TECHNIQUE: CT of the Abdomen and Pelvis was  performed. Axial, sagittal and coronal reformatted images were reviewed.   PROCEDURE: Helical CT images were obtained through the abdomen and pelvis at 3 mm collimation.   CT was performed with one or more of the following dose reduction techniques: automated exposure control, adjustment of the mA and/or kV according to patient size, or use of iterative reconstruction technique.   FINDINGS: Chest: Mild dependent atelectasis without infiltrate or effusion. Calcified aortic valve and mitral valve demonstrated.   Esophagus, stomach, duodenum and small bowel: Distal esophagus is slightly patulous with small hiatal hernia. The stomach is incompletely distended but unremarkable. The duodenum is without abnormality. No dilated small bowel loops seen.   Pancreas:  There is atrophy of the pancreas without focal lesion. No cyst or mass in the pancreas.   Liver: Nodular surface contour with tiny subcentimeter low-attenuation lesion anterior segment right hepatic lobe likely representing cysts but too small to further characterize seen. Ascites surrounds the liver.   Gallbladder and bile ducts: Post cholecystectomy without choledocholithiasis or intra or extrahepatic ductal dilatation.   Adrenal Glands: No abnormality of either adrenal.   Spleen: Splenomegaly demonstrated with tortuous dilated splenic vein. Ascites is seen adjacent to the spleen.   Kidneys and ureters: No obstructive uropathy. No nephrolithiasis. No ureterolithiasis.   Urinary Bladder: Beam hardening artifact from the left hip arthroplasty obscures the incompletely urine distended bladder but no focal abnormality of the bladder demonstrated.   Appendix: No CT evidence of appendicitis.   Large Bowel: Mild ascending, very mild transverse and mild descending and rectosigmoid colonic stool burden. No stercoral ulcer or focal thickening of the walls of large bowel. There is extensive distal descending to sigmoid diverticulosis without focal diverticulitis with a  caliber change that may be due to peristalsis without wall thickening of the mid sigmoid colon from air distension to a narrowed lumen but again without wall thickening.   Intraperitoneal space: Ascites is seen in the pelvis and each colonic gutter as well as surrounding the liver and spleen consistent with sequelae of cirrhosis.   Vasculature: Moderate atherosclerotic disease of the nondilated abdominal aorta and iliac vessels is again seen.   Lymph Nodes: No inguinal, pelvic sidewall, retroperitoneal or mesenteric adenopathy with small lymph node seen amongst the mild central ascites.   Reproductive Organs: Calcified fibroid seen. Adnexa are unremarkable.   Bones/Joints: Discogenic degenerative disease with vacuum phenomenon multiple levels of the lower thoracic and lumbar spine with disc height loss without wedge compression or compression injury. Artifact from left hip arthroplasty.   Soft Tissues: Thin abdominal wall without abdominal wall defect. Levoscoliosis of the lumbar spine. Severe degenerative change of the right hip without acute osseous abnormality. Left hip arthroplasty is incompletely seen with the portions visualized appear intact given the artifacts. There is diffuse mild to moderate anasarca.       1. Cirrhosis with ascites and anasarca now seen compared to the prior exam. 2. Colonic diverticulosis without diverticulitis. No obstruction or ileus. No enteritis or colitis. No stercoral ulcer with much less stool throughout the large bowel compared to the prior study. Much less stool in the rectum compared to the prior study. 3. Small hiatal hernia. 4. No obstructive uropathy. 5. No choledocholithiasis or ductal dilatation. 6. No adenopathy throughout the examination.         This report has been produced using speech recognition. This exam is available in DICOM format to non-affiliated healthcare facilities on a secure media free searchable basis with prior patient authorization. The patient  exposure is reported to a radiation dose index registry. All CT examinations are performed with one or more of the following dose reduction techniques: Automated Exposure Control, Adjustment of mA and/or KV according to patient size, or use of iterative reconstruction techniques.   Signed by: Rickey Flores 12/12/2023 9:58 PM Dictation workstation:   [id number]    CT head wo IV contrast    Result Date: 12/12/2023  Interpreted By:  Rickey Flores, STUDY: CT HEAD WO IV CONTRAST; 12/12/2023 9:38 pm   INDICATION: fall /  felling off balance.   COMPARISON: 8 August 2020.   ACCESSION NUMBER(S): [id number]   ORDERING CLINICIAN: JERSEY DUPREE   TECHNIQUE: CT was performed with one or more of the following dose reduction techniques: automated exposure control, adjustment of the mA and/or kV according to patient size, or use of iterative reconstruction technique.       PROCEDURE: 3.0 mm axial images were obtained through the brain, to include the posterior fossa without intravenous contrast enhancement.   FINDINGS: There is symmetric volume loss of the cerebral hemispheres. Very mild decreased attenuation in the bilateral periventricular white matter, consistent with microangiopathy is noted.  There is no encephalomalacic change. Brainstem is unremarkable. Cerebellar hemispheres are symmetric. No intra- or extra-axial mass or abnormal blood products are demonstrated.   The paranasal sinuses and mastoids as well as calvarium are unremarkable. Calvarium is unremarkable. Soft tissues are unremarkable. No posttraumatic abnormality demonstrated.       1. No acute intracranial findings. 2. Symmetric volume loss of the cerebral hemispheres. 3. Periventricular Microangiopathy. 4. No posttraumatic abnormality.   This report has been produced using speech recognition. This exam is available in DICOM format to non-affiliated healthcare facilities on a secure media free searchable basis with prior patient authorization. The patient  exposure is reported to a radiation dose index registry. All CT examinations are performed with one or more of the following dose reduction techniques: Automated Exposure Control, Adjustment of mA and/or KV according to patient size, or use of iterative reconstruction techniques.   MACRO: None   Signed by: Rickey Flores 12/12/2023 9:49 PM Dictation workstation:   [id number]    Lower extremity venous duplex left    Result Date: 12/12/2023  Interpreted By:  Rickey Flores, STUDY: [facility] US LOWER EXTREMITY VENOUS DUPLEX LEFT; 12/12/2023; 12/12/2023 9:18 pm   INDICATION: Signs/Symptoms:pain swelling.   ACCESSION NUMBER(S): [id number]   ORDERING CLINICIAN: JERSEY DUPREE   COMPARISON: 9 August 2020.   TECHNIQUE: PROCEDURE: Grayscale, 2D, color Doppler, Doppler spectral analysis (waveform) and duplex images obtained.   FINDINGS: Patent with normal flow, and compressibility of the common and superficial femoral, popliteal, and greater saphenous veins  as well as in the tibioperoneal trunk and trifurcation.  There is no subcutaneous edema.       No evidence of deep vein thrombosis.   Signed by: Rickey Flores 12/12/2023 9:25 PM Dictation workstation:   [id number]    XR knee left 4+ views    Result Date: 12/12/2023  Interpreted By:  Rickey Flores, STUDY: XR KNEE LEFT 4+ VIEWS; 12/12/2023 8:54 pm   INDICATION: Signs/Symptoms:pain  swelling.   COMPARISON: 21 July 2022.   ACCESSION NUMBER(S): [id number]   ORDERING CLINICIAN: JERSEY DUPREE   TECHNIQUE: AP, lateral tunnel and oblique views of the left knee were performed.   FINDINGS: There is diffuse subcutaneous edema. Chondrocalcinosis in the mediolateral compartment seen with very mild joint space narrowing. Severe patellofemoral osteoarthritis and joint space narrowing with sclerosis and osteophytosis seen. Trace suprapatellar effusion is seen. Atherosclerotic disease noted. No acute fracture or dislocation. No periostitis.       Osteoarthritis is severe in the patellofemoral  joint more mild in the mediolateral compartments with chondrocalcinosis noted. Extensive subcutaneous edema is seen surrounding the knee. This is suggestive of fluid overload though an injury may have this appearance.       Signed by: Rickey Flores 12/12/2023 9:11 PM Dictation workstation:   [id number]    XR chest 2 views    Result Date: 12/12/2023  Interpreted By:  Rickey Flores, STUDY: XR CHEST 2 VIEWS; 12/12/2023 8:54 pm   INDICATION: Signs/Symptoms:dizziness.   COMPARISON: 13 August 2022   ACCESSION NUMBER(S): [id number]   ORDERING CLINICIAN: JERSEY DUPREE   TECHNIQUE: AP erect view of the chest were performed.   FINDINGS: The left lung is adequately inflated. Elevation of the right hemidiaphragm is again seen  cardiomegaly with greater left ventricular enlargement than the other chambers is unchanged. There is very mild central vascular congestion with coarse vascular markings.       No focal airspace disease with coarse vascular markings and very mild central vascular congestion with left ventricular enlargement unchanged from previous. No focal airspace disease.   Signed by: Rickey Flores 12/12/2023 9:09 PM Dictation workstation:   [id number]        Assessment/Plan   Principal Problem:    Acute kidney injury (CMS/HCC)  Active Problems:    Hypokalemia    Lower extremity edema    Transaminasemia    Ascites      Acute kidney failure  Occurring in the setting of fluid overload and liver cirrhosis with ascites.    Hold oral losartan-hydrochlorothiazide and furosemide  Consult nephrology.  Consider hepatorenal syndrome    Elevated liver enzymes, cirrhosis of the liver with ascites  Interventional radiology consult for diagnostic paracentesis  Hepatitis panel, alpha-fetoprotein  Gastroenterology consult    Hypokalemia  Potassium has been replaced    Fluid overload  Furosemide 40 mg IV x 1    Pain in the left knee  Osteoarthritis and suprapatellar effusion  Pain medication as needed    Elevated ammonia  She is awake  and alert.  Will hold on lactulose    Heart murmur  The patient states she has been aware of this murmur for a long time.  A systolic murmur is also documented in a cardiology consult note from 8/2020    Depressed mood  She currently takes Cymbalta at home, holding this because of elevated creatinine.        Jaymie Trevizo MD

## 2023-12-13 NOTE — ED NOTES
Pt placed on external cath. No other needs stated. Pt remains on the monitor. Bed is low and locked. Plan of care ongoing.      Niecy Castaneda RN  12/13/23 [id number]

## 2023-12-13 NOTE — ED NOTES
Pt observed asleep. RR are even and unlabored. Bed is low and locked. Call light in reach. Plan of care ongoing.      Niecy Castaneda RN  12/13/23 [id number]

## 2023-12-13 NOTE — CONSULTS
Inpatient consult to Gastroenterology  Consult performed by: Nathalie Copeland, APRN-CNP  Consult ordered by: Jaymie Trevizo MD      Reason For Consult  Abnormal liver enzymes, liver cirrhosis, ascites    History Of Present Illness  Kassandra Warner is a 78 y.o. female presenting with frequent falls and complaints of abdominal pain. Patient has a history of cirrhosis.  ER workup included a CT scan abdomen/pelvis which showed Cirrhosis with ascites and anasarca now seen compared to the prior exam. Labs show AST 85, ALT 33, bili 1.9, INR 1.1 The patient has a history of alcohol abuse and stopped drinking in 2020. Last EGD on 9/2021 by Dr. Keith showed small hiatal hernia, grade I esophageal varices, portal hypertensive gastropathy, negative biopsy. Patient has mild abd pain with distension. Denies any nausea, vomiting. No complaints of melena.    Past Medical History  She has a past medical history of Aortic stenosis, Breast cancer (CMS/HCC), COPD (chronic obstructive pulmonary disease) (CMS/HCC), Foot ulcer (CMS/HCC), Gallstones, Hiatal hernia, Hypertension, Left hip pain, Left knee pain, Liver cirrhosis (CMS/HCC), Low back pain, and Rosacea.    Surgical History  She has a past surgical history that includes Appendectomy; Breast lumpectomy (Bilateral); Cataract extraction, bilateral; Liver biopsy; Cholecystectomy; and Total knee arthroplasty (Right).     Social History  She reports that she has never smoked. She has never been exposed to tobacco smoke. She has never used smokeless tobacco. She reports that she does not currently use alcohol. She reports that she does not use drugs.    Family History  Family History   Problem Relation Name Age of Onset    Breast cancer Mother      Hypertension Mother      Colon cancer Father      Diabetes Father      Heart disease Father      Hypertension Father      Heart attack Brother          Cause of death    Heart disease Brother          Allergies  Fentanyl and  Penicillins    Review of Systems   Constitutional: Negative.    HENT: Negative.     Eyes: Negative.    Respiratory: Negative.     Cardiovascular: Negative.    Gastrointestinal:  Positive for abdominal pain.   Endocrine: Negative.    Genitourinary: Negative.    Musculoskeletal: Negative.    Skin: Negative.    Allergic/Immunologic: Negative.    Neurological: Negative.    Hematological: Negative.    Psychiatric/Behavioral: Negative.          Physical Exam  HENT:      Head: Normocephalic.      Nose: Nose normal.      Mouth/Throat:      Mouth: Mucous membranes are moist.   Eyes:      Pupils: Pupils are equal, round, and reactive to light.   Cardiovascular:      Rate and Rhythm: Normal rate.   Pulmonary:      Effort: Pulmonary effort is normal.   Abdominal:      General: There is distension.   Musculoskeletal:         General: Normal range of motion.      Cervical back: Normal range of motion.   Skin:     General: Skin is warm.   Neurological:      General: No focal deficit present.      Mental Status: She is alert.   Psychiatric:         Mood and Affect: Mood normal.          Last Recorded Vitals  Blood pressure 125/68, pulse 84, temperature 36.8 °C (98.2 °F), resp. rate 18, height 1.524 m (5'), weight 63.5 kg (140 lb), SpO2 92 %.    Relevant Results  US guided abdominal paracentesis    Result Date: 12/13/2023  Interpreted By:  Loli Hong, STUDY: US GUIDED ABDOMINAL PARACENTESIS; 12/13/2023 12:12 pm   INDICATION: Signs/Symptoms:Ascites.   COMPARISON: CT scan dated 12/12/2023   ACCESSION NUMBER(S): [id number]   ORDERING CLINICIAN: RENEE VILLANUEVA   TECHNIQUE: Ultrasound-guided paracentesis   FINDINGS: Informed consent obtained. Patient positioned supine. Left lower quadrant prepped, draped and anesthetized. Under ultrasound guidance, 8 Lao centesis sheath needle inserted into peritoneal cavity. 100 mLof clear yellowfluid aspiratedwith sample sent for analysis. Patient tolerated procedure well        Ultrasound-guided diagnostic paracentesis.   Signed by: Loli Hong 12/13/2023 1:47 PM Dictation workstation:   [id number]    ECG 12 lead    Result Date: 12/13/2023  Sinus rhythm with Premature atrial complexes Left axis deviation Low voltage QRS Inferior infarct , age undetermined Possible Anterolateral infarct , age undetermined Prolonged QT Abnormal ECG No previous ECGs available    CT abdomen pelvis wo IV contrast    Result Date: 12/12/2023  Interpreted By:  Rickey Flores, STUDY: CT ABDOMEN PELVIS WO IV CONTRAST; 12/12/2023 9:39 pm   INDICATION: Signs/Symptoms:Abdominal pain.   COMPARISON: 19 September 2023   ACCESSION NUMBER(S): [id number]   ORDERING CLINICIAN: JERSEY DUPREE   TECHNIQUE: CT of the Abdomen and Pelvis was performed. Axial, sagittal and coronal reformatted images were reviewed.   PROCEDURE: Helical CT images were obtained through the abdomen and pelvis at 3 mm collimation.   CT was performed with one or more of the following dose reduction techniques: automated exposure control, adjustment of the mA and/or kV according to patient size, or use of iterative reconstruction technique.   FINDINGS: Chest: Mild dependent atelectasis without infiltrate or effusion. Calcified aortic valve and mitral valve demonstrated.   Esophagus, stomach, duodenum and small bowel: Distal esophagus is slightly patulous with small hiatal hernia. The stomach is incompletely distended but unremarkable. The duodenum is without abnormality. No dilated small bowel loops seen.   Pancreas:  There is atrophy of the pancreas without focal lesion. No cyst or mass in the pancreas.   Liver: Nodular surface contour with tiny subcentimeter low-attenuation lesion anterior segment right hepatic lobe likely representing cysts but too small to further characterize seen. Ascites surrounds the liver.   Gallbladder and bile ducts: Post cholecystectomy without choledocholithiasis or intra or extrahepatic ductal dilatation.   Adrenal  Glands: No abnormality of either adrenal.   Spleen: Splenomegaly demonstrated with tortuous dilated splenic vein. Ascites is seen adjacent to the spleen.   Kidneys and ureters: No obstructive uropathy. No nephrolithiasis. No ureterolithiasis.   Urinary Bladder: Beam hardening artifact from the left hip arthroplasty obscures the incompletely urine distended bladder but no focal abnormality of the bladder demonstrated.   Appendix: No CT evidence of appendicitis.   Large Bowel: Mild ascending, very mild transverse and mild descending and rectosigmoid colonic stool burden. No stercoral ulcer or focal thickening of the walls of large bowel. There is extensive distal descending to sigmoid diverticulosis without focal diverticulitis with a caliber change that may be due to peristalsis without wall thickening of the mid sigmoid colon from air distension to a narrowed lumen but again without wall thickening.   Intraperitoneal space: Ascites is seen in the pelvis and each colonic gutter as well as surrounding the liver and spleen consistent with sequelae of cirrhosis.   Vasculature: Moderate atherosclerotic disease of the nondilated abdominal aorta and iliac vessels is again seen.   Lymph Nodes: No inguinal, pelvic sidewall, retroperitoneal or mesenteric adenopathy with small lymph node seen amongst the mild central ascites.   Reproductive Organs: Calcified fibroid seen. Adnexa are unremarkable.   Bones/Joints: Discogenic degenerative disease with vacuum phenomenon multiple levels of the lower thoracic and lumbar spine with disc height loss without wedge compression or compression injury. Artifact from left hip arthroplasty.   Soft Tissues: Thin abdominal wall without abdominal wall defect. Levoscoliosis of the lumbar spine. Severe degenerative change of the right hip without acute osseous abnormality. Left hip arthroplasty is incompletely seen with the portions visualized appear intact given the artifacts. There is diffuse  mild to moderate anasarca.       1. Cirrhosis with ascites and anasarca now seen compared to the prior exam. 2. Colonic diverticulosis without diverticulitis. No obstruction or ileus. No enteritis or colitis. No stercoral ulcer with much less stool throughout the large bowel compared to the prior study. Much less stool in the rectum compared to the prior study. 3. Small hiatal hernia. 4. No obstructive uropathy. 5. No choledocholithiasis or ductal dilatation. 6. No adenopathy throughout the examination.         This report has been produced using speech recognition. This exam is available in DICOM format to non-affiliated healthcare facilities on a secure media free searchable basis with prior patient authorization. The patient exposure is reported to a radiation dose index registry. All CT examinations are performed with one or more of the following dose reduction techniques: Automated Exposure Control, Adjustment of mA and/or KV according to patient size, or use of iterative reconstruction techniques.   Signed by: Rickey Flores 12/12/2023 9:58 PM Dictation workstation:   [id number]    CT head wo IV contrast    Result Date: 12/12/2023  Interpreted By:  Rickey Flores, STUDY: CT HEAD WO IV CONTRAST; 12/12/2023 9:38 pm   INDICATION: fall /  felling off balance.   COMPARISON: 8 August 2020.   ACCESSION NUMBER(S): [id number]   ORDERING CLINICIAN: JERSEY DUPREE   TECHNIQUE: CT was performed with one or more of the following dose reduction techniques: automated exposure control, adjustment of the mA and/or kV according to patient size, or use of iterative reconstruction technique.       PROCEDURE: 3.0 mm axial images were obtained through the brain, to include the posterior fossa without intravenous contrast enhancement.   FINDINGS: There is symmetric volume loss of the cerebral hemispheres. Very mild decreased attenuation in the bilateral periventricular white matter, consistent with microangiopathy is noted.  There is  no encephalomalacic change. Brainstem is unremarkable. Cerebellar hemispheres are symmetric. No intra- or extra-axial mass or abnormal blood products are demonstrated.   The paranasal sinuses and mastoids as well as calvarium are unremarkable. Calvarium is unremarkable. Soft tissues are unremarkable. No posttraumatic abnormality demonstrated.       1. No acute intracranial findings. 2. Symmetric volume loss of the cerebral hemispheres. 3. Periventricular Microangiopathy. 4. No posttraumatic abnormality.   This report has been produced using speech recognition. This exam is available in DICOM format to non-affiliated healthcare facilities on a secure media free searchable basis with prior patient authorization. The patient exposure is reported to a radiation dose index registry. All CT examinations are performed with one or more of the following dose reduction techniques: Automated Exposure Control, Adjustment of mA and/or KV according to patient size, or use of iterative reconstruction techniques.   MACRO: None   Signed by: Rickey Flores 12/12/2023 9:49 PM Dictation workstation:   [id number]    Lower extremity venous duplex left    Result Date: 12/12/2023  Interpreted By:  Rickey Flores, STUDY: [facility] LOWER EXTREMITY VENOUS DUPLEX LEFT; 12/12/2023; 12/12/2023 9:18 pm   INDICATION: Signs/Symptoms:pain swelling.   ACCESSION NUMBER(S): [id number]   ORDERING CLINICIAN: JERSEY DUPREE   COMPARISON: 9 August 2020.   TECHNIQUE: PROCEDURE: Grayscale, 2D, color Doppler, Doppler spectral analysis (waveform) and duplex images obtained.   FINDINGS: Patent with normal flow, and compressibility of the common and superficial femoral, popliteal, and greater saphenous veins  as well as in the tibioperoneal trunk and trifurcation.  There is no subcutaneous edema.       No evidence of deep vein thrombosis.   Signed by: Rickey Flores 12/12/2023 9:25 PM Dictation workstation:   [id number]    XR knee left 4+ views    Result Date:  12/12/2023  Interpreted By:  Rickey Flores, STUDY: XR KNEE LEFT 4+ VIEWS; 12/12/2023 8:54 pm   INDICATION: Signs/Symptoms:pain  swelling.   COMPARISON: 21 July 2022.   ACCESSION NUMBER(S): [id number]   ORDERING CLINICIAN: JERSEY DUPREE   TECHNIQUE: AP, lateral tunnel and oblique views of the left knee were performed.   FINDINGS: There is diffuse subcutaneous edema. Chondrocalcinosis in the mediolateral compartment seen with very mild joint space narrowing. Severe patellofemoral osteoarthritis and joint space narrowing with sclerosis and osteophytosis seen. Trace suprapatellar effusion is seen. Atherosclerotic disease noted. No acute fracture or dislocation. No periostitis.       Osteoarthritis is severe in the patellofemoral joint more mild in the mediolateral compartments with chondrocalcinosis noted. Extensive subcutaneous edema is seen surrounding the knee. This is suggestive of fluid overload though an injury may have this appearance.       Signed by: Rickey Flores 12/12/2023 9:11 PM Dictation workstation:   [id number]    XR chest 2 views    Result Date: 12/12/2023  Interpreted By:  Rickey Flores, STUDY: XR CHEST 2 VIEWS; 12/12/2023 8:54 pm   INDICATION: Signs/Symptoms:dizziness.   COMPARISON: 13 August 2022   ACCESSION NUMBER(S): [id number]   ORDERING CLINICIAN: JERSEY DUPREE   TECHNIQUE: AP erect view of the chest were performed.   FINDINGS: The left lung is adequately inflated. Elevation of the right hemidiaphragm is again seen  cardiomegaly with greater left ventricular enlargement than the other chambers is unchanged. There is very mild central vascular congestion with coarse vascular markings.       No focal airspace disease with coarse vascular markings and very mild central vascular congestion with left ventricular enlargement unchanged from previous. No focal airspace disease.   Signed by: Rickey Flores 12/12/2023 9:09 PM Dictation workstation:   [id number]      Scheduled medications  cholecalciferol,  2,000 Units, oral, Daily  cyanocobalamin, 1,000 mcg, oral, Daily  docusate sodium, 100 mg, oral, BID  gabapentin, 300 mg, oral, Nightly  letrozole, 2.5 mg, oral, Daily  lipase-protease-amylase, 1 capsule, oral, BID  oxybutynin, 5 mg, oral, BID  pantoprazole, 40 mg, oral, Daily before breakfast  polyethylene glycol, 17 g, oral, Daily      Continuous medications     PRN medications  PRN medications: acetaminophen **OR** acetaminophen **OR** acetaminophen, melatonin, polyethylene glycol  Results for orders placed or performed during the hospital encounter of 12/12/23 (from the past 24 hour(s))   CBC and Auto Differential   Result Value Ref Range    WBC 8.7 4.4 - 11.3 x10*3/uL    nRBC 0.0 0.0 - 0.0 /100 WBCs    RBC 4.02 4.00 - 5.20 x10*6/uL    Hemoglobin 11.6 (L) 12.0 - 16.0 g/dL    Hematocrit 35.3 (L) 36.0 - 46.0 %    MCV 88 80 - 100 fL    MCH 28.9 26.0 - 34.0 pg    MCHC 32.9 32.0 - 36.0 g/dL    RDW 16.3 (H) 11.5 - 14.5 %    Platelets 171 150 - 450 x10*3/uL    Neutrophils % 78.9 40.0 - 80.0 %    Immature Granulocytes %, Automated 0.3 0.0 - 0.9 %    Lymphocytes % 8.4 13.0 - 44.0 %    Monocytes % 11.1 2.0 - 10.0 %    Eosinophils % 1.0 0.0 - 6.0 %    Basophils % 0.3 0.0 - 2.0 %    Neutrophils Absolute 6.83 (H) 1.60 - 5.50 x10*3/uL    Immature Granulocytes Absolute, Automated 0.03 0.00 - 0.50 x10*3/uL    Lymphocytes Absolute 0.73 (L) 0.80 - 3.00 x10*3/uL    Monocytes Absolute 0.96 (H) 0.05 - 0.80 x10*3/uL    Eosinophils Absolute 0.09 0.00 - 0.40 x10*3/uL    Basophils Absolute 0.03 0.00 - 0.10 x10*3/uL   Comprehensive metabolic panel   Result Value Ref Range    Glucose 136 (H) 65 - 99 mg/dL    Sodium 134 133 - 145 mmol/L    Potassium 3.1 (L) 3.4 - 5.1 mmol/L    Chloride 100 97 - 107 mmol/L    Bicarbonate 20 (L) 24 - 31 mmol/L    Urea Nitrogen 47 (H) 8 - 25 mg/dL    Creatinine 2.40 (H) 0.40 - 1.60 mg/dL    eGFR 20 (L) >60 mL/min/1.73m*2    Calcium 9.5 8.5 - 10.4 mg/dL    Albumin 3.7 3.5 - 5.0 g/dL    Alkaline Phosphatase 254  (H) 35 - 125 U/L    Total Protein 6.8 5.9 - 7.9 g/dL    AST 88 (H) 5 - 40 U/L    Bilirubin, Total 2.1 (H) 0.1 - 1.2 mg/dL    ALT 35 5 - 40 U/L    Anion Gap 14 <=19 mmol/L   Lipase   Result Value Ref Range    Lipase 37 16 - 63 U/L   NT Pro-BNP   Result Value Ref Range    PROBNP 216 0 - 624 pg/mL   Serial Troponin, Initial ([facility])   Result Value Ref Range    Troponin T, High Sensitivity 27 (H) <=15 ng/L   Magnesium   Result Value Ref Range    Magnesium 2.10 1.60 - 3.10 mg/dL   Serial Troponin, 6 Hour ([facility])   Result Value Ref Range    Troponin T, High Sensitivity 26 (H) <=15 ng/L   Sars-CoV-2 and Influenza A/B PCR   Result Value Ref Range    Flu A Result Not Detected Not Detected    Flu B Result Not Detected Not Detected    Coronavirus 2019, PCR Not Detected Not Detected   Urinalysis with Reflex Microscopic   Result Value Ref Range    Color, Urine Yellow Light-Yellow, Yellow, Dark-Yellow    Appearance, Urine Clear Clear    Specific Gravity, Urine 1.012 1.005 - 1.035    pH, Urine 5.5 5.0, 5.5, 6.0, 6.5, 7.0, 7.5, 8.0    Protein, Urine 10 (TRACE) NEGATIVE, 10 (TRACE), 20 (TRACE) mg/dL    Glucose, Urine Normal Normal mg/dL    Blood, Urine NEGATIVE NEGATIVE    Ketones, Urine NEGATIVE NEGATIVE mg/dL    Bilirubin, Urine NEGATIVE NEGATIVE    Urobilinogen, Urine Normal Normal mg/dL    Nitrite, Urine NEGATIVE NEGATIVE    Leukocyte Esterase, Urine NEGATIVE NEGATIVE   Alcohol   Result Value Ref Range    Alcohol <0.010 0.000 - 0.010 g/dL   Ammonia   Result Value Ref Range    Ammonia 80 (H) 12 - 45 umol/L   Microscopic Only, Urine   Result Value Ref Range    WBC, Urine 1-5 1-5, NONE /HPF    RBC, Urine 1-2 NONE, 1-2, 3-5 /HPF    Squamous Epithelial Cells, Urine 1-9 (SPARSE) Reference range not established. /HPF    Transitional Epithelial Cells, Urine 1-2 (FEW) Reference range not established. /HPF    Mucus, Urine FEW Reference range not established. /LPF    Hyaline Casts, Urine 4+ (A) NONE /LPF   Comprehensive Metabolic Panel    Result Value Ref Range    Glucose 97 65 - 99 mg/dL    Sodium 136 133 - 145 mmol/L    Potassium 3.9 3.4 - 5.1 mmol/L    Chloride 104 97 - 107 mmol/L    Bicarbonate 18 (L) 24 - 31 mmol/L    Urea Nitrogen 47 (H) 8 - 25 mg/dL    Creatinine 2.10 (H) 0.40 - 1.60 mg/dL    eGFR 24 (L) >60 mL/min/1.73m*2    Calcium 9.4 8.5 - 10.4 mg/dL    Albumin 3.3 (L) 3.5 - 5.0 g/dL    Alkaline Phosphatase 249 (H) 35 - 125 U/L    Total Protein 6.3 5.9 - 7.9 g/dL    AST 85 (H) 5 - 40 U/L    Bilirubin, Total 1.9 (H) 0.1 - 1.2 mg/dL    ALT 33 5 - 40 U/L    Anion Gap 14 <=19 mmol/L   Protime-INR   Result Value Ref Range    Protime 11.8 9.3 - 12.7 seconds    INR 1.1 0.9 - 1.2   aPTT   Result Value Ref Range    aPTT 27.7 22.0 - 32.5 seconds   CBC   Result Value Ref Range    WBC 7.0 4.4 - 11.3 x10*3/uL    nRBC 0.0 0.0 - 0.0 /100 WBCs    RBC 3.80 (L) 4.00 - 5.20 x10*6/uL    Hemoglobin 11.1 (L) 12.0 - 16.0 g/dL    Hematocrit 33.1 (L) 36.0 - 46.0 %    MCV 87 80 - 100 fL    MCH 29.2 26.0 - 34.0 pg    MCHC 33.5 32.0 - 36.0 g/dL    RDW 16.3 (H) 11.5 - 14.5 %    Platelets 132 (L) 150 - 450 x10*3/uL        Assessment/Plan     Cirrhosis of the liver with ascites  Elevated liver enzymes  Meld score 18   HCC- AFP for surveillance pending  HE: Monitor mental status, A/O x 3 no lactulose needed   EV: monitor for varices, no overt bleeding. last EGD 9/2021   Ascites: CT shows ascites seen in the pelvis and each colonic  gutter as well as surrounding the liver and spleen Continue lasix/aldactone as ordered  Interventional radiology consult for diagnostic paracentesis with fluid analysis   Hepatitis panel pending  Low sodium diet   Monitor LFT's   Supportive care     Nathalie Copeland, APRN-CNP

## 2023-12-13 NOTE — ED NOTES
Report received. This RN assumed care. Pt placed on the monitor. Vitals updated. Pt's son remains bedside. Plan of care ongoing.      Niecy Castaneda RN  12/13/23 [id number]

## 2023-12-13 NOTE — CONSULTS
CONSULT: Nephrology SERVICE    SERVICE DATE: 12/13/2023   SERVICE TIME:  12:47 PM    REASON FOR CONSULT: Acute on chronic renal failure  REQUESTING PHYSICIAN: Dr. Trevizo  PRIMARY CARE PHYSICIAN: Evie Rao MD    ASSESSMENT AND PLAN  78-year-old with multiple medical problems including cirrhosis admitted with acute on chronic renal failure in the context of numerous falls.  1.  Acute renal failure  2.  Chronic metabolic acidosis  3.  Chronic kidney disease stage IIIb  4.  Essential hypertension  5.  Cellulitis    Acute renal failure on top of chronic kidney disease stage IIIb with baseline creatinine underlying around 1.2-1.6 range.  Her serum creatinine improved even after furosemide given.  She does have evidence of peripheral volume overload, ascites, however she has a BNP that is not elevated and she is in no respiratory distress.  I would hold all diuretics, similarly I would hold the losartan for now.  I think she needs to be treated for lower extremity cellulitis.  Begin p.o. doxycycline for now.  She does not have a white blood cell count or fever.  There is no evidence of sepsis.  Trend daily labs, continue supportive care.  Should the serum creatinine worsen, I will entertain the idea of IV fluids tomorrow.  I would also check a sonogram as well.  I do not have a suspicion for obstruction at this point.  Continue supportive care otherwise.  Thank you for the consultation.    SUBJECTIVE  Ms. Warner is a 78 y.o. woman with a medical history of COPD, breast cancer, aortic stenosis, and cirrhosis admitted with falls, consulted for acute on chronic renal failure.  This woman has never seen a nephrologist, but has a baseline serum creatinine in the low to mid ones.  She is not known to be proteinuric or diabetic.  She does maintain on losartan, hydrochlorothiazide, and furosemide daily.  She has been taking these faithfully.  She states no dysuria.  There is no diarrhea, nausea, vomiting prior to coming  in.  She may have had some poor oral intake.  She does not have an alcoholic drink in 3 years.  She cites some increased lower extremity swelling, she does have tenderness about her left leg.  No fevers at home.  Serum creatinine was in the mid twos at admission.  She was given some Lasix.  Creatinine improved to 2.1 today.    PAST MEDICAL HISTORY:   Past Medical History:   Diagnosis Date    Aortic stenosis     Breast cancer (CMS/HCC)     COPD (chronic obstructive pulmonary disease) (CMS/HCC)     Foot ulcer (CMS/HCC)     Gallstones     Hiatal hernia     Hypertension     Left hip pain     Left knee pain     Liver cirrhosis (CMS/HCC)     Low back pain     Rosacea      PAST SURGICAL HISTORY:   Past Surgical History:   Procedure Laterality Date    APPENDECTOMY      BREAST LUMPECTOMY Bilateral     CATARACT EXTRACTION, BILATERAL      CHOLECYSTECTOMY      LIVER BIOPSY      TOTAL KNEE ARTHROPLASTY Right      FAMILY HISTORY:   Family History   Problem Relation Name Age of Onset    Breast cancer Mother      Hypertension Mother      Colon cancer Father      Diabetes Father      Heart disease Father      Hypertension Father      Heart attack Brother          Cause of death    Heart disease Brother       SOCIAL HISTORY:   Social History     Tobacco Use    Smoking status: Never     Passive exposure: Never    Smokeless tobacco: Never   Vaping Use    Vaping Use: Never used   Substance Use Topics    Alcohol use: Not Currently     Comment: She drank 2 to 3 drinks per day, stopped drinking alcohol in 2020    Drug use: Never     MEDICATIONS:  cholecalciferol, 2,000 Units, oral, Daily  cyanocobalamin, 1,000 mcg, oral, Daily  docusate sodium, 100 mg, oral, BID  gabapentin, 300 mg, oral, Nightly  letrozole, 2.5 mg, oral, Daily  lipase-protease-amylase, 1 capsule, oral, BID  oxybutynin, 5 mg, oral, BID  pantoprazole, 40 mg, oral, Daily before breakfast  polyethylene glycol, 17 g, oral, Daily           PRN medications: acetaminophen **OR**  acetaminophen **OR** acetaminophen, melatonin, polyethylene glycol   CURRENT ALLERGIES:   Allergies as of 12/12/2023 - Reviewed 12/12/2023   Allergen Reaction Noted    Fentanyl Cardiac arrhythmia/arrest 08/20/2023    Penicillins Rash 08/20/2023       COMPLETE REVIEW OF SYSTEMS:    Full ROS was negative unless mentioned above    OBJECTIVE  PHYSICAL EXAM  Heart Rate:  [66-95]   Temp:  [36.8 °C (98.2 °F)]   Resp:  [15-22]   BP: (106-125)/(46-69)   Height:  [152.4 cm (5')]   Weight:  [63.5 kg (140 lb)]   SpO2:  [92 %-97 %]    Body mass index is 27.34 kg/m².  Obese white woman, appearing chronically ill, no acute distress  Spider angiomata present, no scleral icterus or jaundice  Hearing intact  Phonation intact  Very dry oral mucosa  Harsh systolic murmur  Lungs are clear to auscultation bilaterally  Abdomen is soft, mildly distended, nontender, positive bowel sounds  External urinary catheter in place, no suprapubic tenderness to palpation  Bilateral lower extremity pitting edema with significant erythema and worsening of her left lower extremity, tender to palpation  Moves 4 limbs spontaneously  No obvious joint deformities  No lymphadenopathy    DATA:   Labs:  Results for orders placed or performed during the hospital encounter of 12/12/23 (from the past 96 hour(s))   ECG 12 lead   Result Value Ref Range    Ventricular Rate 90 BPM    Atrial Rate 90 BPM    AZ Interval 180 ms    QRS Duration 100 ms    QT Interval 412 ms    QTC Calculation(Bazett) 504 ms    P Axis -5 degrees    R Axis -59 degrees    T Axis 60 degrees    QRS Count 14 beats    Q Onset 215 ms    P Onset 125 ms    P Offset 183 ms    T Offset 421 ms    QTC Fredericia 471 ms   CBC and Auto Differential   Result Value Ref Range    WBC 8.7 4.4 - 11.3 x10*3/uL    nRBC 0.0 0.0 - 0.0 /100 WBCs    RBC 4.02 4.00 - 5.20 x10*6/uL    Hemoglobin 11.6 (L) 12.0 - 16.0 g/dL    Hematocrit 35.3 (L) 36.0 - 46.0 %    MCV 88 80 - 100 fL    MCH 28.9 26.0 - 34.0 pg    MCHC 32.9  32.0 - 36.0 g/dL    RDW 16.3 (H) 11.5 - 14.5 %    Platelets 171 150 - 450 x10*3/uL    Neutrophils % 78.9 40.0 - 80.0 %    Immature Granulocytes %, Automated 0.3 0.0 - 0.9 %    Lymphocytes % 8.4 13.0 - 44.0 %    Monocytes % 11.1 2.0 - 10.0 %    Eosinophils % 1.0 0.0 - 6.0 %    Basophils % 0.3 0.0 - 2.0 %    Neutrophils Absolute 6.83 (H) 1.60 - 5.50 x10*3/uL    Immature Granulocytes Absolute, Automated 0.03 0.00 - 0.50 x10*3/uL    Lymphocytes Absolute 0.73 (L) 0.80 - 3.00 x10*3/uL    Monocytes Absolute 0.96 (H) 0.05 - 0.80 x10*3/uL    Eosinophils Absolute 0.09 0.00 - 0.40 x10*3/uL    Basophils Absolute 0.03 0.00 - 0.10 x10*3/uL   Comprehensive metabolic panel   Result Value Ref Range    Glucose 136 (H) 65 - 99 mg/dL    Sodium 134 133 - 145 mmol/L    Potassium 3.1 (L) 3.4 - 5.1 mmol/L    Chloride 100 97 - 107 mmol/L    Bicarbonate 20 (L) 24 - 31 mmol/L    Urea Nitrogen 47 (H) 8 - 25 mg/dL    Creatinine 2.40 (H) 0.40 - 1.60 mg/dL    eGFR 20 (L) >60 mL/min/1.73m*2    Calcium 9.5 8.5 - 10.4 mg/dL    Albumin 3.7 3.5 - 5.0 g/dL    Alkaline Phosphatase 254 (H) 35 - 125 U/L    Total Protein 6.8 5.9 - 7.9 g/dL    AST 88 (H) 5 - 40 U/L    Bilirubin, Total 2.1 (H) 0.1 - 1.2 mg/dL    ALT 35 5 - 40 U/L    Anion Gap 14 <=19 mmol/L   Lipase   Result Value Ref Range    Lipase 37 16 - 63 U/L   NT Pro-BNP   Result Value Ref Range    PROBNP 216 0 - 624 pg/mL   Serial Troponin, Initial ([facility])   Result Value Ref Range    Troponin T, High Sensitivity 27 (H) <=15 ng/L   Magnesium   Result Value Ref Range    Magnesium 2.10 1.60 - 3.10 mg/dL   Serial Troponin, 6 Hour ([facility])   Result Value Ref Range    Troponin T, High Sensitivity 26 (H) <=15 ng/L   Sars-CoV-2 and Influenza A/B PCR   Result Value Ref Range    Flu A Result Not Detected Not Detected    Flu B Result Not Detected Not Detected    Coronavirus 2019, PCR Not Detected Not Detected   Urinalysis with Reflex Microscopic   Result Value Ref Range    Color, Urine Yellow Light-Yellow, Yellow,  Dark-Yellow    Appearance, Urine Clear Clear    Specific Gravity, Urine 1.012 1.005 - 1.035    pH, Urine 5.5 5.0, 5.5, 6.0, 6.5, 7.0, 7.5, 8.0    Protein, Urine 10 (TRACE) NEGATIVE, 10 (TRACE), 20 (TRACE) mg/dL    Glucose, Urine Normal Normal mg/dL    Blood, Urine NEGATIVE NEGATIVE    Ketones, Urine NEGATIVE NEGATIVE mg/dL    Bilirubin, Urine NEGATIVE NEGATIVE    Urobilinogen, Urine Normal Normal mg/dL    Nitrite, Urine NEGATIVE NEGATIVE    Leukocyte Esterase, Urine NEGATIVE NEGATIVE   Alcohol   Result Value Ref Range    Alcohol <0.010 0.000 - 0.010 g/dL   Ammonia   Result Value Ref Range    Ammonia 80 (H) 12 - 45 umol/L   Microscopic Only, Urine   Result Value Ref Range    WBC, Urine 1-5 1-5, NONE /HPF    RBC, Urine 1-2 NONE, 1-2, 3-5 /HPF    Squamous Epithelial Cells, Urine 1-9 (SPARSE) Reference range not established. /HPF    Transitional Epithelial Cells, Urine 1-2 (FEW) Reference range not established. /HPF    Mucus, Urine FEW Reference range not established. /LPF    Hyaline Casts, Urine 4+ (A) NONE /LPF   Comprehensive Metabolic Panel   Result Value Ref Range    Glucose 97 65 - 99 mg/dL    Sodium 136 133 - 145 mmol/L    Potassium 3.9 3.4 - 5.1 mmol/L    Chloride 104 97 - 107 mmol/L    Bicarbonate 18 (L) 24 - 31 mmol/L    Urea Nitrogen 47 (H) 8 - 25 mg/dL    Creatinine 2.10 (H) 0.40 - 1.60 mg/dL    eGFR 24 (L) >60 mL/min/1.73m*2    Calcium 9.4 8.5 - 10.4 mg/dL    Albumin 3.3 (L) 3.5 - 5.0 g/dL    Alkaline Phosphatase 249 (H) 35 - 125 U/L    Total Protein 6.3 5.9 - 7.9 g/dL    AST 85 (H) 5 - 40 U/L    Bilirubin, Total 1.9 (H) 0.1 - 1.2 mg/dL    ALT 33 5 - 40 U/L    Anion Gap 14 <=19 mmol/L   Protime-INR   Result Value Ref Range    Protime 11.8 9.3 - 12.7 seconds    INR 1.1 0.9 - 1.2   aPTT   Result Value Ref Range    aPTT 27.7 22.0 - 32.5 seconds   CBC   Result Value Ref Range    WBC 7.0 4.4 - 11.3 x10*3/uL    nRBC 0.0 0.0 - 0.0 /100 WBCs    RBC 3.80 (L) 4.00 - 5.20 x10*6/uL    Hemoglobin 11.1 (L) 12.0 - 16.0  g/dL    Hematocrit 33.1 (L) 36.0 - 46.0 %    MCV 87 80 - 100 fL    MCH 29.2 26.0 - 34.0 pg    MCHC 33.5 32.0 - 36.0 g/dL    RDW 16.3 (H) 11.5 - 14.5 %    Platelets 132 (L) 150 - 450 x10*3/uL       SIGNATURE: Arnulfo Linda MD PATIENT NAME: Kassandra Warner   DATE: December 13, 2023 MRN: [id number]   TIME: 12:47 PM PAGER: [phone]

## 2023-12-14 LAB
ALBUMIN SERPL-MCNC: 3.3 G/DL (ref 3.5–5)
ALP BLD-CCNC: 252 U/L (ref 35–125)
ALT SERPL-CCNC: 32 U/L (ref 5–40)
ANION GAP SERPL CALC-SCNC: 16 MMOL/L
AST SERPL-CCNC: 81 U/L (ref 5–40)
ATRIAL RATE: 90 BPM
BILIRUB SERPL-MCNC: 1.7 MG/DL (ref 0.1–1.2)
BUN SERPL-MCNC: 53 MG/DL (ref 8–25)
CALCIUM SERPL-MCNC: 9.4 MG/DL (ref 8.5–10.4)
CHLORIDE SERPL-SCNC: 102 MMOL/L (ref 97–107)
CO2 SERPL-SCNC: 18 MMOL/L (ref 24–31)
CREAT SERPL-MCNC: 2 MG/DL (ref 0.4–1.6)
ERYTHROCYTE [DISTWIDTH] IN BLOOD BY AUTOMATED COUNT: 16.7 % (ref 11.5–14.5)
GFR SERPL CREATININE-BSD FRML MDRD: 25 ML/MIN/1.73M*2
GLUCOSE SERPL-MCNC: 107 MG/DL (ref 65–99)
HCT VFR BLD AUTO: 34.1 % (ref 36–46)
HGB BLD-MCNC: 10.9 G/DL (ref 12–16)
INR PPP: 1.1 (ref 0.9–1.2)
MAGNESIUM SERPL-MCNC: 2.1 MG/DL (ref 1.6–3.1)
MCH RBC QN AUTO: 29.2 PG (ref 26–34)
MCHC RBC AUTO-ENTMCNC: 32 G/DL (ref 32–36)
MCV RBC AUTO: 91 FL (ref 80–100)
NRBC BLD-RTO: 0 /100 WBCS (ref 0–0)
P AXIS: -5 DEGREES
P OFFSET: 183 MS
P ONSET: 125 MS
PLATELET # BLD AUTO: 142 X10*3/UL (ref 150–450)
POTASSIUM SERPL-SCNC: 3.6 MMOL/L (ref 3.4–5.1)
PR INTERVAL: 180 MS
PROT SERPL-MCNC: 6.4 G/DL (ref 5.9–7.9)
PROTHROMBIN TIME: 12 SECONDS (ref 9.3–12.7)
Q ONSET: 215 MS
QRS COUNT: 14 BEATS
QRS DURATION: 100 MS
QT INTERVAL: 412 MS
QTC CALCULATION(BAZETT): 504 MS
QTC FREDERICIA: 471 MS
R AXIS: -59 DEGREES
RBC # BLD AUTO: 3.73 X10*6/UL (ref 4–5.2)
SODIUM SERPL-SCNC: 136 MMOL/L (ref 133–145)
T AXIS: 60 DEGREES
T OFFSET: 421 MS
VENTRICULAR RATE: 90 BPM
WBC # BLD AUTO: 8.1 X10*3/UL (ref 4.4–11.3)

## 2023-12-14 PROCEDURE — 36415 COLL VENOUS BLD VENIPUNCTURE: CPT | Performed by: INTERNAL MEDICINE

## 2023-12-14 PROCEDURE — 85610 PROTHROMBIN TIME: CPT | Performed by: INTERNAL MEDICINE

## 2023-12-14 PROCEDURE — 2500000001 HC RX 250 WO HCPCS SELF ADMINISTERED DRUGS (ALT 637 FOR MEDICARE OP): Performed by: INTERNAL MEDICINE

## 2023-12-14 PROCEDURE — 83735 ASSAY OF MAGNESIUM: CPT | Performed by: INTERNAL MEDICINE

## 2023-12-14 PROCEDURE — 97530 THERAPEUTIC ACTIVITIES: CPT | Mod: GP

## 2023-12-14 PROCEDURE — 2500000004 HC RX 250 GENERAL PHARMACY W/ HCPCS (ALT 636 FOR OP/ED): Performed by: INTERNAL MEDICINE

## 2023-12-14 PROCEDURE — 97162 PT EVAL MOD COMPLEX 30 MIN: CPT | Mod: GP

## 2023-12-14 PROCEDURE — 80053 COMPREHEN METABOLIC PANEL: CPT | Performed by: INTERNAL MEDICINE

## 2023-12-14 PROCEDURE — 85027 COMPLETE CBC AUTOMATED: CPT | Performed by: INTERNAL MEDICINE

## 2023-12-14 PROCEDURE — 1200000002 HC GENERAL ROOM WITH TELEMETRY DAILY

## 2023-12-14 RX ADMIN — CYANOCOBALAMIN TAB 1000 MCG 1000 MCG: 1000 TAB at 10:07

## 2023-12-14 RX ADMIN — PANCRELIPASE 1 CAPSULE: 24000; 76000; 120000 CAPSULE, DELAYED RELEASE PELLETS ORAL at 10:57

## 2023-12-14 RX ADMIN — GABAPENTIN 300 MG: 300 CAPSULE ORAL at 21:15

## 2023-12-14 RX ADMIN — OXYBUTYNIN CHLORIDE 5 MG: 5 TABLET ORAL at 10:06

## 2023-12-14 RX ADMIN — PANTOPRAZOLE SODIUM 40 MG: 40 TABLET, DELAYED RELEASE ORAL at 06:45

## 2023-12-14 RX ADMIN — CEFEPIME 1 G: 1 INJECTION, POWDER, FOR SOLUTION INTRAMUSCULAR; INTRAVENOUS at 16:12

## 2023-12-14 RX ADMIN — PANCRELIPASE 1 CAPSULE: 24000; 76000; 120000 CAPSULE, DELAYED RELEASE PELLETS ORAL at 21:15

## 2023-12-14 RX ADMIN — OXYBUTYNIN CHLORIDE 5 MG: 5 TABLET ORAL at 21:15

## 2023-12-14 RX ADMIN — POLYETHYLENE GLYCOL 3350 17 G: 17 POWDER, FOR SOLUTION ORAL at 10:06

## 2023-12-14 RX ADMIN — DOCUSATE SODIUM 100 MG: 100 CAPSULE, LIQUID FILLED ORAL at 10:07

## 2023-12-14 RX ADMIN — CEFEPIME 1 G: 1 INJECTION, POWDER, FOR SOLUTION INTRAMUSCULAR; INTRAVENOUS at 04:23

## 2023-12-14 RX ADMIN — Medication 2000 UNITS: at 10:07

## 2023-12-14 RX ADMIN — LETROZOLE 2.5 MG: 2.5 TABLET ORAL at 10:56

## 2023-12-14 RX ADMIN — DOCUSATE SODIUM 100 MG: 100 CAPSULE, LIQUID FILLED ORAL at 21:15

## 2023-12-14 ASSESSMENT — COGNITIVE AND FUNCTIONAL STATUS - GENERAL
MOBILITY SCORE: 11
DAILY ACTIVITIY SCORE: 15
WALKING IN HOSPITAL ROOM: A LOT
EATING MEALS: A LITTLE
MOBILITY SCORE: 12
HELP NEEDED FOR BATHING: A LOT
DRESSING REGULAR UPPER BODY CLOTHING: A LOT
TURNING FROM BACK TO SIDE WHILE IN FLAT BAD: A LOT
TURNING FROM BACK TO SIDE WHILE IN FLAT BAD: A LOT
STANDING UP FROM CHAIR USING ARMS: A LOT
STANDING UP FROM CHAIR USING ARMS: A LOT
MOVING FROM LYING ON BACK TO SITTING ON SIDE OF FLAT BED WITH BEDRAILS: A LOT
PERSONAL GROOMING: A LITTLE
CLIMB 3 TO 5 STEPS WITH RAILING: TOTAL
MOVING TO AND FROM BED TO CHAIR: A LOT
DRESSING REGULAR LOWER BODY CLOTHING: A LOT
TOILETING: A LITTLE
MOVING FROM LYING ON BACK TO SITTING ON SIDE OF FLAT BED WITH BEDRAILS: A LOT
MOVING TO AND FROM BED TO CHAIR: A LOT
CLIMB 3 TO 5 STEPS WITH RAILING: A LOT
WALKING IN HOSPITAL ROOM: A LOT

## 2023-12-14 ASSESSMENT — PAIN SCALES - GENERAL
PAINLEVEL_OUTOF10: 0 - NO PAIN
PAINLEVEL_OUTOF10: 0 - NO PAIN

## 2023-12-14 ASSESSMENT — ACTIVITIES OF DAILY LIVING (ADL): LACK_OF_TRANSPORTATION: YES

## 2023-12-14 ASSESSMENT — PAIN SCALES - WONG BAKER: WONGBAKER_NUMERICALRESPONSE: NO HURT

## 2023-12-14 NOTE — PROGRESS NOTES
Physical Therapy    Physical Therapy Evaluation & Treatment    Patient Name: Kassandra Warner  MRN: [id number]  Today's Date: 12/14/2023   Time Calculation  Start Time: 1312  Stop Time: 1338  Time Calculation (min): 26 min    Assessment/Plan   PT Assessment  PT Assessment Results: Decreased strength, Decreased range of motion, Decreased endurance, Impaired balance, Decreased mobility, Decreased coordination, Impaired judgement  Rehab Prognosis: Good  Evaluation/Treatment Tolerance: Patient limited by fatigue  Medical Staff Made Aware: Yes  End of Session Communication: Bedside nurse  Assessment Comment: pt would benefit from skilled therapy services; pt is not safe to return home alone  End of Session Patient Position: Bed, 2 rail up, Alarm on (call button in reach)  IP OR SWING BED PT PLAN  Inpatient or Swing Bed: Inpatient  PT Plan  Treatment/Interventions: Bed mobility, Transfer training, Gait training, Balance training, Stair training, Strengthening, Endurance training  PT Plan: Skilled PT  PT Frequency: 4 times per week  PT Discharge Recommendations: Moderate intensity level of continued care  Equipment Recommended upon Discharge: Wheeled walker  PT Recommended Transfer Status:  (MOD A)  PT - OK to Discharge: Yes (with continued skilled therapy services)      Subjective   General Visit Information:  General  Reason for Referral: Acute kidney injury; recent falls;s/p US guided paracentesis; impaired mobility  Past Medical History Relevant to Rehab: osteoporosis; cirrhosis; OA; CKD; GERD; COPD; L THR; neuropathy; sleep apnea; HTN; RA; hypothyroid; lumbar spondylosis; diverticulosis; hiatal hernia; cellulitis  Prior to Session Communication: Bedside nurse  Patient Position Received: Bed, 2 rail up, Alarm on  General Comment: pt alert but initially dozing off; VC to stay on task    Home Living:  Type of Home: House  Lives With: Alone  Home Adaptive Equipment: Cane  Home Layout: One level  Home Access:  2-3 entry  stairs with 1 railing  Bathroom Shower/Tub: Tub/shower unit  Bathroom Equipment: Grab bars in shower, Shower chair with back  Home Living Comments: pt has laundry in basement but does not use. pts son and DIL assist with laundry    Prior Level of Function:  Prior Function Per Pt/Caregiver Report  Level of [place]: Independent with ADLs and functional transfers  Receives Help From:  (pts son and DIL assist as needed)  Ambulatory Assistance: Independent (uses cane vs Rolling walker PRN)  Prior Function Comments: pt reported having multiple falls recently; pts son provides transportation    Precautions:  Precautions  Hearing/Visual Limitations: wears glasses  Medical Precautions: Fall precautions       Objective   Pain:  Pain Assessment:  (0/10)    Cognition:  Overall Cognitive Status: Within Functional Limits  Orientation Level: Oriented X4    General Assessments:  General Observation: pt reported being 'tired' at this time       Activity Tolerance  Activity Tolerance Comments: easily fatigued with activities    Sensation  Sensation Comment: intact       Coordination  Coordination Comment: difficulty advancing B LE    Postural Control  Posture Comment: retrolean in sitting EOB    Static Sitting-Balance Support:  (FAIR-)  Dynamic Sitting-Balance Support:  (FAIR-)       Functional Assessments:  Bed Mobility:  supine <-> sit with MOD A for trunk up and B LE. MAX A for scooting. fair- sitting balance on EOB; MIN/MOD A for trunk support; pt refused to try further and asked to lay back down; transferred pt back to supine       Extremity/Trunk Assessments:  RUE :  limited B shoulder elevation  LUE:  limited B shoulder elevation  RLE :  (AAROM WFL  with 3-/5 strength)  LLE :  (AAROM WFL with 3-/5 strength)    Outcome Measures:  [facility] Basic Mobility  Turning from your back to your side while in a flat bed without using bedrails: A lot  Moving from lying on your back to sitting on the side of a flat bed without using  bedrails: A lot  Moving to and from bed to chair (including a wheelchair): A lot  Standing up from a chair using your arms (e.g. wheelchair or bedside chair): A lot  To walk in hospital room: A lot  Climbing 3-5 steps with railing: Total  Basic Mobility - Total Score: 11    Encounter Problems       Encounter Problems (Active)       Mobility       STG - Patient will ambulate 40' x 1 using rolling walker with SUPERVISION (Progressing)       Start:  12/14/23    Expected End:  12/28/23            STG - Patient will negotiate 3 stairs using 1 railing CGA (Progressing)       Start:  12/14/23    Expected End:  12/28/23               Pain - Adult          Transfers       STG - Patient to transfer to and from sit to supine CGA (Progressing)       Start:  12/14/23    Expected End:  12/28/23            STG - Patient will transfer sit to and from stand CGA (Progressing)       Start:  12/14/23    Expected End:  12/28/23                   Education Documentation  Mobility Training, taught by Parish Nicolas, PT at 12/14/2023  2:40 PM.  Learner: Patient  Readiness: Eager  Method: Explanation, Demonstration  Response: Verbalizes Understanding

## 2023-12-14 NOTE — NURSING NOTE
Assumed care of the patient. Patient in room with son. No complaints at this time. Call light in reach. Will continue to monitor

## 2023-12-14 NOTE — PROGRESS NOTES
Kassandra Warner is a 78 y.o. female on day 1 of admission presenting with Acute kidney injury (CMS/HCC).      Subjective   Patient states that she is feeling a little bit better today.  Decreased pain.       Objective     Last Recorded Vitals  BP (!) 120/45 (BP Location: Right arm, Patient Position: Lying)   Pulse 65   Temp 36.4 °C (97.5 °F) (Oral)   Resp 18   Wt 63.5 kg (140 lb)   SpO2 93%   Intake/Output last 3 Shifts:    Intake/Output Summary (Last 24 hours) at 12/14/2023 0940  Last data filed at 12/13/2023 1618  Gross per 24 hour   Intake 50 ml   Output --   Net 50 ml       Admission Weight  Weight: 63.5 kg (140 lb) (12/12/23 2000)    Daily Weight  12/12/23 : 63.5 kg (140 lb)    Image Results  US guided abdominal paracentesis  Narrative: Interpreted By:  Loli Hong,   STUDY:  US GUIDED ABDOMINAL PARACENTESIS; 12/13/2023 12:12 pm      INDICATION:  Signs/Symptoms:Ascites.      COMPARISON:  CT scan dated 12/12/2023      ACCESSION NUMBER(S):  [id number]      ORDERING CLINICIAN:  RENEE VILLANUEVA      TECHNIQUE:  Ultrasound-guided paracentesis      FINDINGS:  Informed consent obtained. Patient positioned supine. Left lower  quadrant prepped, draped and anesthetized. Under ultrasound guidance,  8 Sinhala centesis sheath needle inserted into peritoneal cavity. 100  mLof clear yellowfluid aspiratedwith sample sent for analysis.  Patient tolerated procedure well      Impression: Ultrasound-guided diagnostic paracentesis.      Signed by: Loli Hong 12/13/2023 1:47 PM  Dictation workstation:   [id number]  ECG 12 lead  Sinus rhythm with Premature atrial complexes  Left axis deviation  Low voltage QRS  Inferior infarct , age undetermined  Possible Anterolateral infarct , age undetermined  Prolonged QT  Abnormal ECG  No previous ECGs available      Physical Exam    Patient is alert, cooperative with exam.  She is in no apparent respiratory distress.  Skin: There is erythema with some induration of the  skin of the distal left lower extremity.  Looks a little bit better today no open wounds.  Lungs are diminished bilaterally.  No wheezes, rales, rhonchi.  Heart: Regular rate with systolic murmur.  Abdomen is increased in size with ascites.  Mildly tender diffusely.  Bowel sounds positive.  Patient moves all extremities.  Relevant Results             Results for orders placed or performed during the hospital encounter of 12/12/23 (from the past 24 hour(s))   Sterile Fluid Culture/Smear    Specimen: Ascites Fluid   Result Value Ref Range    Gram Stain No polymorphonuclear leukocytes seen     Gram Stain No organisms seen    Albumin, Fluid   Result Value Ref Range    Albumin, Fluid 0.6 Not established g/dL   Body Fluid Cell Count   Result Value Ref Range    Color, Fluid Yellow Colorless, Straw, Yellow    Clarity, Fluid Clear Clear    WBC, Fluid 311 See Comment /uL    RBC, Fluid 2,000 0  /uL /uL   Body Fluid Differential   Result Value Ref Range    Neutrophils %, Manual, Fluid 6 <25 % %    Lymphocytes %, Manual, Fluid 42 <75 % %    Mono/Macrophages %, Manual, Fluid 52 <70 % %    Eosinophils %, Manual, Fluid 0 0 % %    Basophils %, Manual, Fluid 0 0 % %    Immature Granulocytes %, Manual, Fluid 0 0 % %    Blasts %, Manual, Fluid 0 0 % %    Unclassified Cells %, Manual, Fluid 0 0 % %    Plasma Cells %, Manual, Fluid 0 0 % %    Total Cells Counted, Fluid 100    Protein, Total Fluid   Result Value Ref Range    Protein, Total Fluid 1.0 Not established g/dL   Comprehensive metabolic panel   Result Value Ref Range    Glucose 107 (H) 65 - 99 mg/dL    Sodium 136 133 - 145 mmol/L    Potassium 3.6 3.4 - 5.1 mmol/L    Chloride 102 97 - 107 mmol/L    Bicarbonate 18 (L) 24 - 31 mmol/L    Urea Nitrogen 53 (H) 8 - 25 mg/dL    Creatinine 2.00 (H) 0.40 - 1.60 mg/dL    eGFR 25 (L) >60 mL/min/1.73m*2    Calcium 9.4 8.5 - 10.4 mg/dL    Albumin 3.3 (L) 3.5 - 5.0 g/dL    Alkaline Phosphatase 252 (H) 35 - 125 U/L    Total Protein 6.4 5.9 - 7.9  g/dL    AST 81 (H) 5 - 40 U/L    Bilirubin, Total 1.7 (H) 0.1 - 1.2 mg/dL    ALT 32 5 - 40 U/L    Anion Gap 16 <=19 mmol/L   Magnesium   Result Value Ref Range    Magnesium 2.10 1.60 - 3.10 mg/dL   Protime-INR   Result Value Ref Range    Protime 12.0 9.3 - 12.7 seconds    INR 1.1 0.9 - 1.2   CBC   Result Value Ref Range    WBC 8.1 4.4 - 11.3 x10*3/uL    nRBC 0.0 0.0 - 0.0 /100 WBCs    RBC 3.73 (L) 4.00 - 5.20 x10*6/uL    Hemoglobin 10.9 (L) 12.0 - 16.0 g/dL    Hematocrit 34.1 (L) 36.0 - 46.0 %    MCV 91 80 - 100 fL    MCH 29.2 26.0 - 34.0 pg    MCHC 32.0 32.0 - 36.0 g/dL    RDW 16.7 (H) 11.5 - 14.5 %    Platelets 142 (L) 150 - 450 x10*3/uL    US guided abdominal paracentesis    Result Date: 12/13/2023  Interpreted By:  Loli Hong, STUDY: US GUIDED ABDOMINAL PARACENTESIS; 12/13/2023 12:12 pm   INDICATION: Signs/Symptoms:Ascites.   COMPARISON: CT scan dated 12/12/2023   ACCESSION NUMBER(S): [id number]   ORDERING CLINICIAN: RENEE VILLANUEVA   TECHNIQUE: Ultrasound-guided paracentesis   FINDINGS: Informed consent obtained. Patient positioned supine. Left lower quadrant prepped, draped and anesthetized. Under ultrasound guidance, 8 Stateless centesis sheath needle inserted into peritoneal cavity. 100 mLof clear yellowfluid aspiratedwith sample sent for analysis. Patient tolerated procedure well       Ultrasound-guided diagnostic paracentesis.   Signed by: Loli Hong 12/13/2023 1:47 PM Dictation workstation:   [id number]    ECG 12 lead    Result Date: 12/13/2023  Sinus rhythm with Premature atrial complexes Left axis deviation Low voltage QRS Inferior infarct , age undetermined Possible Anterolateral infarct , age undetermined Prolonged QT Abnormal ECG No previous ECGs available    CT abdomen pelvis wo IV contrast    Result Date: 12/12/2023  Interpreted By:  Rickey Flores, STUDY: CT ABDOMEN PELVIS WO IV CONTRAST; 12/12/2023 9:39 pm   INDICATION: Signs/Symptoms:Abdominal pain.   COMPARISON: 19 September 2023    ACCESSION NUMBER(S): [id number]   ORDERING CLINICIAN: JERSEY DUPREE   TECHNIQUE: CT of the Abdomen and Pelvis was performed. Axial, sagittal and coronal reformatted images were reviewed.   PROCEDURE: Helical CT images were obtained through the abdomen and pelvis at 3 mm collimation.   CT was performed with one or more of the following dose reduction techniques: automated exposure control, adjustment of the mA and/or kV according to patient size, or use of iterative reconstruction technique.   FINDINGS: Chest: Mild dependent atelectasis without infiltrate or effusion. Calcified aortic valve and mitral valve demonstrated.   Esophagus, stomach, duodenum and small bowel: Distal esophagus is slightly patulous with small hiatal hernia. The stomach is incompletely distended but unremarkable. The duodenum is without abnormality. No dilated small bowel loops seen.   Pancreas:  There is atrophy of the pancreas without focal lesion. No cyst or mass in the pancreas.   Liver: Nodular surface contour with tiny subcentimeter low-attenuation lesion anterior segment right hepatic lobe likely representing cysts but too small to further characterize seen. Ascites surrounds the liver.   Gallbladder and bile ducts: Post cholecystectomy without choledocholithiasis or intra or extrahepatic ductal dilatation.   Adrenal Glands: No abnormality of either adrenal.   Spleen: Splenomegaly demonstrated with tortuous dilated splenic vein. Ascites is seen adjacent to the spleen.   Kidneys and ureters: No obstructive uropathy. No nephrolithiasis. No ureterolithiasis.   Urinary Bladder: Beam hardening artifact from the left hip arthroplasty obscures the incompletely urine distended bladder but no focal abnormality of the bladder demonstrated.   Appendix: No CT evidence of appendicitis.   Large Bowel: Mild ascending, very mild transverse and mild descending and rectosigmoid colonic stool burden. No stercoral ulcer or focal thickening of the walls  of large bowel. There is extensive distal descending to sigmoid diverticulosis without focal diverticulitis with a caliber change that may be due to peristalsis without wall thickening of the mid sigmoid colon from air distension to a narrowed lumen but again without wall thickening.   Intraperitoneal space: Ascites is seen in the pelvis and each colonic gutter as well as surrounding the liver and spleen consistent with sequelae of cirrhosis.   Vasculature: Moderate atherosclerotic disease of the nondilated abdominal aorta and iliac vessels is again seen.   Lymph Nodes: No inguinal, pelvic sidewall, retroperitoneal or mesenteric adenopathy with small lymph node seen amongst the mild central ascites.   Reproductive Organs: Calcified fibroid seen. Adnexa are unremarkable.   Bones/Joints: Discogenic degenerative disease with vacuum phenomenon multiple levels of the lower thoracic and lumbar spine with disc height loss without wedge compression or compression injury. Artifact from left hip arthroplasty.   Soft Tissues: Thin abdominal wall without abdominal wall defect. Levoscoliosis of the lumbar spine. Severe degenerative change of the right hip without acute osseous abnormality. Left hip arthroplasty is incompletely seen with the portions visualized appear intact given the artifacts. There is diffuse mild to moderate anasarca.       1. Cirrhosis with ascites and anasarca now seen compared to the prior exam. 2. Colonic diverticulosis without diverticulitis. No obstruction or ileus. No enteritis or colitis. No stercoral ulcer with much less stool throughout the large bowel compared to the prior study. Much less stool in the rectum compared to the prior study. 3. Small hiatal hernia. 4. No obstructive uropathy. 5. No choledocholithiasis or ductal dilatation. 6. No adenopathy throughout the examination.         This report has been produced using speech recognition. This exam is available in DICOM format to non-affiliated  healthcare facilities on a secure media free searchable basis with prior patient authorization. The patient exposure is reported to a radiation dose index registry. All CT examinations are performed with one or more of the following dose reduction techniques: Automated Exposure Control, Adjustment of mA and/or KV according to patient size, or use of iterative reconstruction techniques.   Signed by: Rickey Flores 12/12/2023 9:58 PM Dictation workstation:   [id number]    CT head wo IV contrast    Result Date: 12/12/2023  Interpreted By:  Rickey Flores, STUDY: CT HEAD WO IV CONTRAST; 12/12/2023 9:38 pm   INDICATION: fall /  felling off balance.   COMPARISON: 8 August 2020.   ACCESSION NUMBER(S): [id number]   ORDERING CLINICIAN: JERSEY DUPREE   TECHNIQUE: CT was performed with one or more of the following dose reduction techniques: automated exposure control, adjustment of the mA and/or kV according to patient size, or use of iterative reconstruction technique.       PROCEDURE: 3.0 mm axial images were obtained through the brain, to include the posterior fossa without intravenous contrast enhancement.   FINDINGS: There is symmetric volume loss of the cerebral hemispheres. Very mild decreased attenuation in the bilateral periventricular white matter, consistent with microangiopathy is noted.  There is no encephalomalacic change. Brainstem is unremarkable. Cerebellar hemispheres are symmetric. No intra- or extra-axial mass or abnormal blood products are demonstrated.   The paranasal sinuses and mastoids as well as calvarium are unremarkable. Calvarium is unremarkable. Soft tissues are unremarkable. No posttraumatic abnormality demonstrated.       1. No acute intracranial findings. 2. Symmetric volume loss of the cerebral hemispheres. 3. Periventricular Microangiopathy. 4. No posttraumatic abnormality.   This report has been produced using speech recognition. This exam is available in DICOM format to non-affiliated  healthcare facilities on a secure media free searchable basis with prior patient authorization. The patient exposure is reported to a radiation dose index registry. All CT examinations are performed with one or more of the following dose reduction techniques: Automated Exposure Control, Adjustment of mA and/or KV according to patient size, or use of iterative reconstruction techniques.   MACRO: None   Signed by: Rickey Flores 12/12/2023 9:49 PM Dictation workstation:   [id number]    Lower extremity venous duplex left    Result Date: 12/12/2023  Interpreted By:  Rickey Flores, STUDY: [facility] LOWER EXTREMITY VENOUS DUPLEX LEFT; 12/12/2023; 12/12/2023 9:18 pm   INDICATION: Signs/Symptoms:pain swelling.   ACCESSION NUMBER(S): [id number]   ORDERING CLINICIAN: JERSEY DUPREE   COMPARISON: 9 August 2020.   TECHNIQUE: PROCEDURE: Grayscale, 2D, color Doppler, Doppler spectral analysis (waveform) and duplex images obtained.   FINDINGS: Patent with normal flow, and compressibility of the common and superficial femoral, popliteal, and greater saphenous veins  as well as in the tibioperoneal trunk and trifurcation.  There is no subcutaneous edema.       No evidence of deep vein thrombosis.   Signed by: Rickey Flores 12/12/2023 9:25 PM Dictation workstation:   [id number]    XR knee left 4+ views    Result Date: 12/12/2023  Interpreted By:  Rickey Flores, STUDY: XR KNEE LEFT 4+ VIEWS; 12/12/2023 8:54 pm   INDICATION: Signs/Symptoms:pain  swelling.   COMPARISON: 21 July 2022.   ACCESSION NUMBER(S): [id number]   ORDERING CLINICIAN: JERSEY DUPREE   TECHNIQUE: AP, lateral tunnel and oblique views of the left knee were performed.   FINDINGS: There is diffuse subcutaneous edema. Chondrocalcinosis in the mediolateral compartment seen with very mild joint space narrowing. Severe patellofemoral osteoarthritis and joint space narrowing with sclerosis and osteophytosis seen. Trace suprapatellar effusion is seen. Atherosclerotic disease noted.  No acute fracture or dislocation. No periostitis.       Osteoarthritis is severe in the patellofemoral joint more mild in the mediolateral compartments with chondrocalcinosis noted. Extensive subcutaneous edema is seen surrounding the knee. This is suggestive of fluid overload though an injury may have this appearance.       Signed by: Rickey Flores 12/12/2023 9:11 PM Dictation workstation:   [id number]    XR chest 2 views    Result Date: 12/12/2023  Interpreted By:  Rickey Flores, STUDY: XR CHEST 2 VIEWS; 12/12/2023 8:54 pm   INDICATION: Signs/Symptoms:dizziness.   COMPARISON: 13 August 2022   ACCESSION NUMBER(S): [id number]   ORDERING CLINICIAN: JERSEY DUPREE   TECHNIQUE: AP erect view of the chest were performed.   FINDINGS: The left lung is adequately inflated. Elevation of the right hemidiaphragm is again seen  cardiomegaly with greater left ventricular enlargement than the other chambers is unchanged. There is very mild central vascular congestion with coarse vascular markings.       No focal airspace disease with coarse vascular markings and very mild central vascular congestion with left ventricular enlargement unchanged from previous. No focal airspace disease.   Signed by: Rickey Flores 12/12/2023 9:09 PM Dictation workstation:   [id number]    Assessment/Plan                  Principal Problem:    Acute kidney injury (CMS/HCC)  Active Problems:    Hypokalemia    Lower extremity edema    Transaminasemia    Ascites    Acute kidney failure, possible hepatorenal syndrome.  Renal function is a little bit better today.  Followed by nephrologist  Liver cirrhosis due to alcohol abuse in the past, patient is sober since 2020.  Ascites  Spontaneous bacterial peritonitis.  Ascitic fluid is consistent with SBP due to elevated white count.  Started on cefepime  Left lower extremity cellulitis.  Change antibiotic to cefepime.  Time spent with patient 30 minutes.     12/14: Function is improving.  Continue antibiotics  for cellulitis and possible spontaneous bacterial peritonitis.  Will consult PT OT, may need to go to rehab.  Anticipated discharge in 48 hours         Meghan William MD

## 2023-12-14 NOTE — CONSULTS
"Nutrition Assessment Note    Met with Pt due to MST 2. No recorded meals since admitted. Pt states that she thinks she lost wt, but she reports her UBW as 138#. Most recent wt from 12/12 is 140#. Pt's wt loss likely masked by fluid status. Pt reports she hasn't been eating well for the last 4 months since her  passed away. She states that she eats once per day when her son brings her some fast food. Discussed how eating, even if it is a small snack or nutritional shake may help to increase her appetite. Pt agreeable to trying Mighty Shakes TID to increase calories and protein.    Nutrition Assessment    Reason for Assessment: Admission nursing screening (MST 2)    Reason for Hospital Admission:  Kassandra Warner is a 78 y.o. female who is admitted for Acute Kidney Injury with ascites and lower extremity edema. PMHx of EToH cirrhosis of the liver, COPD and GERD.    Nutrition History:  Food and Nutrient History: Pt reports poor appetite over the last 4 months. Pt states she eats 1 meal a day when her son brings her some fast food. Pt denies eating much else. Stated that she sometimes would drink a Boost at home. Pt stated she had breakfast this morning, and, \"I probably won't really eat much else today\".  Energy Intake: Poor < 50 %    Anthropometrics:  Ht: 152.4 cm (5'), Wt: 63.5 kg (140 lb), BMI: 27.34  IBW/kg (Dietitian Calculated): 45.45 kg  Percent of IBW: 140 %       Weight Change:  Weight History / % Weight Change: Pt states that she thinks she lost wt, but she reports her UBW as 138#. Pt unsure of time frame. Most recent wt from 12/12 is 140#. Per chart review, Pt wt from 6/10/22 was 150# 9.6oz. Pt is currently ~7% down from this weight. Pt has generalized, non-piting edema, this is likely masking the weight loss.  Significant Weight Loss: No  Interpretation of Weight Loss: Other (see comment) (~7% in 1.5 years, Recent wt loss masked by edema.)  Significant Weight Gain: Fluid related    Nutrition Focused " Physical Exam Findings:   Subcutaneous Fat Loss  Orbital Fat Pads: Mild-Moderate (slight dark circles and slight hollowing)  Buccal Fat Pads: Well nourished (full, rounded cheeks)  Triceps: Mild-moderate (less than ample fat tissue)    Muscle Wasting  Temporalis: Mild-Moderate (slight depression)  Pectoralis (Clavicular Region): Mild-Moderate (some protrusion of clavicle)    Edema  Edema Location: Generalized, non-pitting edema per chart         Nutrition Significant Labs:  Lab Results   Component Value Date    WBC 8.1 12/14/2023    HGB 10.9 (L) 12/14/2023    HCT 34.1 (L) 12/14/2023     (L) 12/14/2023    CHOL 138 10/11/2021    TRIG 198 (H) 10/11/2021    HDL 42 (L) 10/11/2021    ALT 32 12/14/2023    AST 81 (H) 12/14/2023     12/14/2023    K 3.6 12/14/2023     12/14/2023    CREATININE 2.00 (H) 12/14/2023    BUN 53 (H) 12/14/2023    CO2 18 (L) 12/14/2023    TSH 3.72 01/08/2021    INR 1.1 12/14/2023    HGBA1C 5.4 03/03/2023       Current Facility-Administered Medications:     acetaminophen (Tylenol) tablet 650 mg, 650 mg, oral, q4h PRN, 650 mg at 12/13/23 2202 **OR** acetaminophen (Tylenol) oral liquid 650 mg, 650 mg, oral, q4h PRN **OR** acetaminophen (Tylenol) suppository 650 mg, 650 mg, rectal, q4h PRN, Jaymie Trevizo MD    cefepime (Maxipime) 1 g in dextrose 5 % 50 mL IV, 1 g, intravenous, q12h J CARLOS, Meghan William MD, Stopped at 12/14/23 0453    cholecalciferol (Vitamin D-3) tablet 2,000 Units, 2,000 Units, oral, Daily, Jaymie Trevizo MD, 2,000 Units at 12/14/23 1007    cyanocobalamin (Vitamin B-12) tablet 1,000 mcg, 1,000 mcg, oral, Daily, Jaymie Trevizo MD, 1,000 mcg at 12/14/23 1007    docusate sodium (Colace) capsule 100 mg, 100 mg, oral, BID, Jaymie Trevizo MD, 100 mg at 12/14/23 1007    gabapentin (Neurontin) capsule 300 mg, 300 mg, oral, Nightly, Jaymie Trevizo MD, 300 mg at 12/13/23 2200    letrozole (Femara) tablet 2.5 mg, 2.5 mg, oral,  Daily, Jaymie Trevizo MD, 2.5 mg at 12/14/23 1056    lipase-protease-amylase (Creon) 24,000-76,000 -120,000 unit per capsule 1 capsule, 1 capsule, oral, BID, Jaymie Trevizo MD, 1 capsule at 12/14/23 1057    melatonin tablet 5 mg, 5 mg, oral, Nightly PRN, Jaymie Trevizo MD    oxybutynin (Ditropan) tablet 5 mg, 5 mg, oral, BID, Jaymie Trevizo MD, 5 mg at 12/14/23 1006    pantoprazole (ProtoNix) EC tablet 40 mg, 40 mg, oral, Daily before breakfast, Jaymie Trevizo MD, 40 mg at 12/14/23 0645    polyethylene glycol (Glycolax, Miralax) packet 17 g, 17 g, oral, Daily, Jaymie Trevizo MD, 17 g at 12/14/23 1006    polyethylene glycol (Glycolax, Miralax) packet 17 g, 17 g, oral, Daily PRN, Jaymie Trevizo MD      Dietary Orders (From admission, onward)       Start     Ordered    12/14/23 1328  Oral nutritional supplements  Until discontinued        Comments: Vanilla   Question Answer Comment   Deliver with All meals    Select supplement: Sugar Free Mighty Shake        12/14/23 1327    12/13/23 0215  Adult diet 2-3 grams sodium  Diet effective now        Question:  Diet type  Answer:  2-3 grams sodium    12/13/23 0214                  Estimated Needs:   Estimated Energy Needs  Total Energy Estimated Needs (kCal):  (3920-0588 kcals)  Total Estimated Energy Need per Day (kCal/kg):  (25-35 kcals/kg)  Method for Estimating Needs: actual wt    Estimated Protein Needs  Total Protein Estimated Needs (g):  (64-70 g/kg)  Total Protein Estimated Needs (g/kg):  (1-1.1 g/kg)  Method for Estimating Needs: actual wt    Estimated Fluid Needs  Total Fluid Estimated Needs (mL):  (7090-6869 mL)  Method for Estimating Needs: 1 mL/kg        Nutrition Diagnosis   Nutrition Diagnosis:  Malnutrition Diagnosis  Patient has Malnutrition Diagnosis: Yes  Diagnosis Status: New  Malnutrition Diagnosis: Moderate malnutrition related to chronic disease or condition  As Evidenced by: Prolonged poor  intake < 75% estimated energy needs for 4 months, Mild fat and muscle deficits, fluid accumulation may be masking weight loss.            Nutrition Interventions/Recommendations   Nutrition Interventions and Recommendations:    Nutrition Prescription:  Individualized Nutrition Prescription Provided for : 0194-8414 calories, 64-70 g protein, 1609-2393 mL fluid, as provided by diet and supplements.    Nutrition Interventions:   Food and/or Nutrient Delivery Interventions  Interventions: Meals and snacks, Medical food supplement  Meals and Snacks: Mineral-modified diet  Goal: Provide diet as ordered  Medical Food Supplement: Commercial beverage  Goal: Mighty Shake TID, provides 200 kcals and 7 g Pro per carton    Education Documentation  No documentation found.           Nutrition Monitoring and Evaluation   Monitoring/Evaluation:   Food/Nutrient Related History Monitoring  Monitoring and Evaluation Plan: Energy intake  Energy Intake: Estimated energy intake  Criteria: Pt to tolerate >50% of PO intake    __       Time Spent/Follow-up:   Follow Up  Last Date of Nutrition Visit: 12/14/23  Nutrition Follow-Up Needed?: 3-8 days  Follow up Comment: 12/19/2023

## 2023-12-14 NOTE — PROGRESS NOTES
CONSULT PROGRESS NOTES    SERVICE DATE: 12/14/2023   SERVICE TIME: 2:54 PM    CONSULTING SERVICE: Nephrology    ASSESSMENT AND PLAN   78-year-old with multiple medical problems including cirrhosis admitted with acute on chronic renal failure in the context of numerous falls.  1.  Acute renal failure  2.  Chronic metabolic acidosis  3.  Chronic kidney disease stage IIIb  4.  Essential hypertension  5.  Cellulitis     Acute renal failure on top of chronic kidney disease stage IIIb with baseline creatinine underlying around 1.2-1.6 range.  Her serum creatinine improved slightly overnight.  She does have evidence of peripheral volume overload, ascites, however she has a BNP that is not elevated and she is in no respiratory distress.  I would hold all diuretics, similarly I would hold the losartan for now.  She is on treatment for SBP, this should cover the lower extremity cellulitis.    Trend daily labs, continue supportive care.    Continue supportive care otherwise.     SUBJECTIVE  INTERVAL HPI: She has no new symptoms.  She has been diagnosed with SBP.  Antibiotics have been broadened from doxycycline to cefepime.  She is confused, not overly garrulous with me during my evaluation today.    MEDICATIONS:  cefepime, 1 g, intravenous, q12h J CARLOS  cholecalciferol, 2,000 Units, oral, Daily  cyanocobalamin, 1,000 mcg, oral, Daily  docusate sodium, 100 mg, oral, BID  gabapentin, 300 mg, oral, Nightly  letrozole, 2.5 mg, oral, Daily  lipase-protease-amylase, 1 capsule, oral, BID  oxybutynin, 5 mg, oral, BID  pantoprazole, 40 mg, oral, Daily before breakfast  polyethylene glycol, 17 g, oral, Daily           PRN medications: acetaminophen **OR** acetaminophen **OR** acetaminophen, melatonin, polyethylene glycol     OBJECTIVE  PHYSICAL EXAM:   Heart Rate:  [65-72]   Temp:  [36.4 °C (97.5 °F)-36.8 °C (98.2 °F)]   Resp:  [18-20]   BP: (109-125)/(45-57)   SpO2:  [93 %-96 %]   Body mass index is 27.34 kg/m².  Obese white woman,  appearing chronically ill, no acute distress  Spider angiomata present, no scleral icterus or jaundice  Hearing intact  Phonation intact  Very dry oral mucosa  Harsh systolic murmur  Lungs are clear to auscultation bilaterally  Abdomen is soft, mildly distended, nontender, positive bowel sounds  External urinary catheter in place, no suprapubic tenderness to palpation  Bilateral lower extremity pitting edema with significant erythema and worsening of her left lower extremity, tender to palpation  Moves 4 limbs spontaneously  No obvious joint deformities  No lymphadenopathy       DATA:   Labs:  Results for orders placed or performed during the hospital encounter of 12/12/23 (from the past 96 hour(s))   ECG 12 lead   Result Value Ref Range    Ventricular Rate 90 BPM    Atrial Rate 90 BPM    NM Interval 180 ms    QRS Duration 100 ms    QT Interval 412 ms    QTC Calculation(Bazett) 504 ms    P Axis -5 degrees    R Axis -59 degrees    T Axis 60 degrees    QRS Count 14 beats    Q Onset 215 ms    P Onset 125 ms    P Offset 183 ms    T Offset 421 ms    QTC Fredericia 471 ms   CBC and Auto Differential   Result Value Ref Range    WBC 8.7 4.4 - 11.3 x10*3/uL    nRBC 0.0 0.0 - 0.0 /100 WBCs    RBC 4.02 4.00 - 5.20 x10*6/uL    Hemoglobin 11.6 (L) 12.0 - 16.0 g/dL    Hematocrit 35.3 (L) 36.0 - 46.0 %    MCV 88 80 - 100 fL    MCH 28.9 26.0 - 34.0 pg    MCHC 32.9 32.0 - 36.0 g/dL    RDW 16.3 (H) 11.5 - 14.5 %    Platelets 171 150 - 450 x10*3/uL    Neutrophils % 78.9 40.0 - 80.0 %    Immature Granulocytes %, Automated 0.3 0.0 - 0.9 %    Lymphocytes % 8.4 13.0 - 44.0 %    Monocytes % 11.1 2.0 - 10.0 %    Eosinophils % 1.0 0.0 - 6.0 %    Basophils % 0.3 0.0 - 2.0 %    Neutrophils Absolute 6.83 (H) 1.60 - 5.50 x10*3/uL    Immature Granulocytes Absolute, Automated 0.03 0.00 - 0.50 x10*3/uL    Lymphocytes Absolute 0.73 (L) 0.80 - 3.00 x10*3/uL    Monocytes Absolute 0.96 (H) 0.05 - 0.80 x10*3/uL    Eosinophils Absolute 0.09 0.00 - 0.40  x10*3/uL    Basophils Absolute 0.03 0.00 - 0.10 x10*3/uL   Comprehensive metabolic panel   Result Value Ref Range    Glucose 136 (H) 65 - 99 mg/dL    Sodium 134 133 - 145 mmol/L    Potassium 3.1 (L) 3.4 - 5.1 mmol/L    Chloride 100 97 - 107 mmol/L    Bicarbonate 20 (L) 24 - 31 mmol/L    Urea Nitrogen 47 (H) 8 - 25 mg/dL    Creatinine 2.40 (H) 0.40 - 1.60 mg/dL    eGFR 20 (L) >60 mL/min/1.73m*2    Calcium 9.5 8.5 - 10.4 mg/dL    Albumin 3.7 3.5 - 5.0 g/dL    Alkaline Phosphatase 254 (H) 35 - 125 U/L    Total Protein 6.8 5.9 - 7.9 g/dL    AST 88 (H) 5 - 40 U/L    Bilirubin, Total 2.1 (H) 0.1 - 1.2 mg/dL    ALT 35 5 - 40 U/L    Anion Gap 14 <=19 mmol/L   Lipase   Result Value Ref Range    Lipase 37 16 - 63 U/L   NT Pro-BNP   Result Value Ref Range    PROBNP 216 0 - 624 pg/mL   Serial Troponin, Initial ([facility])   Result Value Ref Range    Troponin T, High Sensitivity 27 (H) <=15 ng/L   Magnesium   Result Value Ref Range    Magnesium 2.10 1.60 - 3.10 mg/dL   Serial Troponin, 6 Hour ([facility])   Result Value Ref Range    Troponin T, High Sensitivity 26 (H) <=15 ng/L   Sars-CoV-2 and Influenza A/B PCR   Result Value Ref Range    Flu A Result Not Detected Not Detected    Flu B Result Not Detected Not Detected    Coronavirus 2019, PCR Not Detected Not Detected   Urinalysis with Reflex Microscopic   Result Value Ref Range    Color, Urine Yellow Light-Yellow, Yellow, Dark-Yellow    Appearance, Urine Clear Clear    Specific Gravity, Urine 1.012 1.005 - 1.035    pH, Urine 5.5 5.0, 5.5, 6.0, 6.5, 7.0, 7.5, 8.0    Protein, Urine 10 (TRACE) NEGATIVE, 10 (TRACE), 20 (TRACE) mg/dL    Glucose, Urine Normal Normal mg/dL    Blood, Urine NEGATIVE NEGATIVE    Ketones, Urine NEGATIVE NEGATIVE mg/dL    Bilirubin, Urine NEGATIVE NEGATIVE    Urobilinogen, Urine Normal Normal mg/dL    Nitrite, Urine NEGATIVE NEGATIVE    Leukocyte Esterase, Urine NEGATIVE NEGATIVE   Alcohol   Result Value Ref Range    Alcohol <0.010 0.000 - 0.010 g/dL   Ammonia    Result Value Ref Range    Ammonia 80 (H) 12 - 45 umol/L   Microscopic Only, Urine   Result Value Ref Range    WBC, Urine 1-5 1-5, NONE /HPF    RBC, Urine 1-2 NONE, 1-2, 3-5 /HPF    Squamous Epithelial Cells, Urine 1-9 (SPARSE) Reference range not established. /HPF    Transitional Epithelial Cells, Urine 1-2 (FEW) Reference range not established. /HPF    Mucus, Urine FEW Reference range not established. /LPF    Hyaline Casts, Urine 4+ (A) NONE /LPF   Comprehensive Metabolic Panel   Result Value Ref Range    Glucose 97 65 - 99 mg/dL    Sodium 136 133 - 145 mmol/L    Potassium 3.9 3.4 - 5.1 mmol/L    Chloride 104 97 - 107 mmol/L    Bicarbonate 18 (L) 24 - 31 mmol/L    Urea Nitrogen 47 (H) 8 - 25 mg/dL    Creatinine 2.10 (H) 0.40 - 1.60 mg/dL    eGFR 24 (L) >60 mL/min/1.73m*2    Calcium 9.4 8.5 - 10.4 mg/dL    Albumin 3.3 (L) 3.5 - 5.0 g/dL    Alkaline Phosphatase 249 (H) 35 - 125 U/L    Total Protein 6.3 5.9 - 7.9 g/dL    AST 85 (H) 5 - 40 U/L    Bilirubin, Total 1.9 (H) 0.1 - 1.2 mg/dL    ALT 33 5 - 40 U/L    Anion Gap 14 <=19 mmol/L   Protime-INR   Result Value Ref Range    Protime 11.8 9.3 - 12.7 seconds    INR 1.1 0.9 - 1.2   aPTT   Result Value Ref Range    aPTT 27.7 22.0 - 32.5 seconds   CBC   Result Value Ref Range    WBC 7.0 4.4 - 11.3 x10*3/uL    nRBC 0.0 0.0 - 0.0 /100 WBCs    RBC 3.80 (L) 4.00 - 5.20 x10*6/uL    Hemoglobin 11.1 (L) 12.0 - 16.0 g/dL    Hematocrit 33.1 (L) 36.0 - 46.0 %    MCV 87 80 - 100 fL    MCH 29.2 26.0 - 34.0 pg    MCHC 33.5 32.0 - 36.0 g/dL    RDW 16.3 (H) 11.5 - 14.5 %    Platelets 132 (L) 150 - 450 x10*3/uL   AFP Tumor Marker   Result Value Ref Range    Alpha-Fetoprotein 32 (H) 0 - 9 ng/mL   Hepatitis panel, acute   Result Value Ref Range    Hepatitis B Surface AG Nonreactive Nonreactive    Hepatitis A  AB- IgM Nonreactive Nonreactive    Hepatitis B Core AB; IgM Nonreactive Nonreactive    Hepatitis C AB Nonreactive Nonreactive   Sterile Fluid Culture/Smear    Specimen: Ascites Fluid    Result Value Ref Range    Sterile Fluid Culture/Smear No growth to date     Gram Stain No polymorphonuclear leukocytes seen     Gram Stain No organisms seen    Albumin, Fluid   Result Value Ref Range    Albumin, Fluid 0.6 Not established g/dL   Body Fluid Cell Count   Result Value Ref Range    Color, Fluid Yellow Colorless, Straw, Yellow    Clarity, Fluid Clear Clear    WBC, Fluid 311 See Comment /uL    RBC, Fluid 2,000 0  /uL /uL   Body Fluid Differential   Result Value Ref Range    Neutrophils %, Manual, Fluid 6 <25 % %    Lymphocytes %, Manual, Fluid 42 <75 % %    Mono/Macrophages %, Manual, Fluid 52 <70 % %    Eosinophils %, Manual, Fluid 0 0 % %    Basophils %, Manual, Fluid 0 0 % %    Immature Granulocytes %, Manual, Fluid 0 0 % %    Blasts %, Manual, Fluid 0 0 % %    Unclassified Cells %, Manual, Fluid 0 0 % %    Plasma Cells %, Manual, Fluid 0 0 % %    Total Cells Counted, Fluid 100    Protein, Total Fluid   Result Value Ref Range    Protein, Total Fluid 1.0 Not established g/dL   Comprehensive metabolic panel   Result Value Ref Range    Glucose 107 (H) 65 - 99 mg/dL    Sodium 136 133 - 145 mmol/L    Potassium 3.6 3.4 - 5.1 mmol/L    Chloride 102 97 - 107 mmol/L    Bicarbonate 18 (L) 24 - 31 mmol/L    Urea Nitrogen 53 (H) 8 - 25 mg/dL    Creatinine 2.00 (H) 0.40 - 1.60 mg/dL    eGFR 25 (L) >60 mL/min/1.73m*2    Calcium 9.4 8.5 - 10.4 mg/dL    Albumin 3.3 (L) 3.5 - 5.0 g/dL    Alkaline Phosphatase 252 (H) 35 - 125 U/L    Total Protein 6.4 5.9 - 7.9 g/dL    AST 81 (H) 5 - 40 U/L    Bilirubin, Total 1.7 (H) 0.1 - 1.2 mg/dL    ALT 32 5 - 40 U/L    Anion Gap 16 <=19 mmol/L   Magnesium   Result Value Ref Range    Magnesium 2.10 1.60 - 3.10 mg/dL   Protime-INR   Result Value Ref Range    Protime 12.0 9.3 - 12.7 seconds    INR 1.1 0.9 - 1.2   CBC   Result Value Ref Range    WBC 8.1 4.4 - 11.3 x10*3/uL    nRBC 0.0 0.0 - 0.0 /100 WBCs    RBC 3.73 (L) 4.00 - 5.20 x10*6/uL    Hemoglobin 10.9 (L) 12.0 - 16.0 g/dL     Hematocrit 34.1 (L) 36.0 - 46.0 %    MCV 91 80 - 100 fL    MCH 29.2 26.0 - 34.0 pg    MCHC 32.0 32.0 - 36.0 g/dL    RDW 16.7 (H) 11.5 - 14.5 %    Platelets 142 (L) 150 - 450 x10*3/uL         SIGNATURE: Arnulfo Linda MD PATIENT NAME: Kassandra Warner   DATE: December 14, 2023 MRN: [id number]   TIME: 2:54 PM PAGER: [phone]

## 2023-12-14 NOTE — PROGRESS NOTES
TCC met with patient. Assessment complete. Patient lives alone with support from children. Patient's spouse passed away 4 months ago. Patient fairly independent. Patient reports falls in the past 6 months. Patient has a cane and a walker. Patient's son transports her. Patient reports depression and is not treated. Patient follows Dr. MAY Rao. Patient fills prescriptions at [facility] in [place]. At this time there is not a safe discharge plan in place. Will follow.       Beatriz Trevino RN

## 2023-12-14 NOTE — PROGRESS NOTES
Kassandra Warner is a 78 y.o. female on day 1 of admission presenting with Acute kidney injury (CMS/HCC).    Subjective     Patient lying in bed, feeling tired and weak.  Patient states she feels a little better.  Denies any current abdominal pain, nausea, vomiting.  Having regular BMs    Objective     Physical Exam  HENT:      Head: Normocephalic.      Nose: Nose normal.      Mouth/Throat:      Mouth: Mucous membranes are moist.   Eyes:      Pupils: Pupils are equal, round, and reactive to light.   Cardiovascular:      Rate and Rhythm: Normal rate.   Pulmonary:      Effort: Pulmonary effort is normal.   Abdominal:      General: There is distension.      Palpations: Abdomen is soft.   Musculoskeletal:         General: Normal range of motion.      Cervical back: Normal range of motion.   Skin:     General: Skin is warm.   Neurological:      General: No focal deficit present.      Mental Status: She is alert.   Psychiatric:         Mood and Affect: Mood normal.         Last Recorded Vitals  Blood pressure (!) 120/45, pulse 65, temperature 36.4 °C (97.5 °F), temperature source Oral, resp. rate 18, height 1.524 m (5'), weight 63.5 kg (140 lb), SpO2 93 %.  Intake/Output last 3 Shifts:  I/O last 3 completed shifts:  In: 50 (0.8 mL/kg) [IV Piggyback:50]  Out: - (0 mL/kg)   Weight: 63.5 kg     Relevant Results  US guided abdominal paracentesis    Result Date: 12/13/2023  Interpreted By:  Loli Hong, STUDY: US GUIDED ABDOMINAL PARACENTESIS; 12/13/2023 12:12 pm   INDICATION: Signs/Symptoms:Ascites.   COMPARISON: CT scan dated 12/12/2023   ACCESSION NUMBER(S): [id number]   ORDERING CLINICIAN: RENEE VILLANUEVA   TECHNIQUE: Ultrasound-guided paracentesis   FINDINGS: Informed consent obtained. Patient positioned supine. Left lower quadrant prepped, draped and anesthetized. Under ultrasound guidance, 8 [place] centesis sheath needle inserted into peritoneal cavity. 100 mLof clear yellowfluid aspiratedwith sample sent for  analysis. Patient tolerated procedure well       Ultrasound-guided diagnostic paracentesis.   Signed by: Loli Hong 12/13/2023 1:47 PM Dictation workstation:   [id number]    ECG 12 lead    Result Date: 12/13/2023  Sinus rhythm with Premature atrial complexes Left axis deviation Low voltage QRS Inferior infarct , age undetermined Possible Anterolateral infarct , age undetermined Prolonged QT Abnormal ECG No previous ECGs available    CT abdomen pelvis wo IV contrast    Result Date: 12/12/2023  Interpreted By:  Rickey Flores, STUDY: CT ABDOMEN PELVIS WO IV CONTRAST; 12/12/2023 9:39 pm   INDICATION: Signs/Symptoms:Abdominal pain.   COMPARISON: 19 September 2023   ACCESSION NUMBER(S): [id number]   ORDERING CLINICIAN: JERSEY DUPREE   TECHNIQUE: CT of the Abdomen and Pelvis was performed. Axial, sagittal and coronal reformatted images were reviewed.   PROCEDURE: Helical CT images were obtained through the abdomen and pelvis at 3 mm collimation.   CT was performed with one or more of the following dose reduction techniques: automated exposure control, adjustment of the mA and/or kV according to patient size, or use of iterative reconstruction technique.   FINDINGS: Chest: Mild dependent atelectasis without infiltrate or effusion. Calcified aortic valve and mitral valve demonstrated.   Esophagus, stomach, duodenum and small bowel: Distal esophagus is slightly patulous with small hiatal hernia. The stomach is incompletely distended but unremarkable. The duodenum is without abnormality. No dilated small bowel loops seen.   Pancreas:  There is atrophy of the pancreas without focal lesion. No cyst or mass in the pancreas.   Liver: Nodular surface contour with tiny subcentimeter low-attenuation lesion anterior segment right hepatic lobe likely representing cysts but too small to further characterize seen. Ascites surrounds the liver.   Gallbladder and bile ducts: Post cholecystectomy without choledocholithiasis or intra  or extrahepatic ductal dilatation.   Adrenal Glands: No abnormality of either adrenal.   Spleen: Splenomegaly demonstrated with tortuous dilated splenic vein. Ascites is seen adjacent to the spleen.   Kidneys and ureters: No obstructive uropathy. No nephrolithiasis. No ureterolithiasis.   Urinary Bladder: Beam hardening artifact from the left hip arthroplasty obscures the incompletely urine distended bladder but no focal abnormality of the bladder demonstrated.   Appendix: No CT evidence of appendicitis.   Large Bowel: Mild ascending, very mild transverse and mild descending and rectosigmoid colonic stool burden. No stercoral ulcer or focal thickening of the walls of large bowel. There is extensive distal descending to sigmoid diverticulosis without focal diverticulitis with a caliber change that may be due to peristalsis without wall thickening of the mid sigmoid colon from air distension to a narrowed lumen but again without wall thickening.   Intraperitoneal space: Ascites is seen in the pelvis and each colonic gutter as well as surrounding the liver and spleen consistent with sequelae of cirrhosis.   Vasculature: Moderate atherosclerotic disease of the nondilated abdominal aorta and iliac vessels is again seen.   Lymph Nodes: No inguinal, pelvic sidewall, retroperitoneal or mesenteric adenopathy with small lymph node seen amongst the mild central ascites.   Reproductive Organs: Calcified fibroid seen. Adnexa are unremarkable.   Bones/Joints: Discogenic degenerative disease with vacuum phenomenon multiple levels of the lower thoracic and lumbar spine with disc height loss without wedge compression or compression injury. Artifact from left hip arthroplasty.   Soft Tissues: Thin abdominal wall without abdominal wall defect. Levoscoliosis of the lumbar spine. Severe degenerative change of the right hip without acute osseous abnormality. Left hip arthroplasty is incompletely seen with the portions visualized appear  intact given the artifacts. There is diffuse mild to moderate anasarca.       1. Cirrhosis with ascites and anasarca now seen compared to the prior exam. 2. Colonic diverticulosis without diverticulitis. No obstruction or ileus. No enteritis or colitis. No stercoral ulcer with much less stool throughout the large bowel compared to the prior study. Much less stool in the rectum compared to the prior study. 3. Small hiatal hernia. 4. No obstructive uropathy. 5. No choledocholithiasis or ductal dilatation. 6. No adenopathy throughout the examination.         This report has been produced using speech recognition. This exam is available in DICOM format to non-affiliated healthcare facilities on a secure media free searchable basis with prior patient authorization. The patient exposure is reported to a radiation dose index registry. All CT examinations are performed with one or more of the following dose reduction techniques: Automated Exposure Control, Adjustment of mA and/or KV according to patient size, or use of iterative reconstruction techniques.   Signed by: Rickey Flores 12/12/2023 9:58 PM Dictation workstation:   [id number]    CT head wo IV contrast    Result Date: 12/12/2023  Interpreted By:  Rickey Flores, STUDY: CT HEAD WO IV CONTRAST; 12/12/2023 9:38 pm   INDICATION: fall /  felling off balance.   COMPARISON: 8 August 2020.   ACCESSION NUMBER(S): [id number]   ORDERING CLINICIAN: JERSEY DUPREE   TECHNIQUE: CT was performed with one or more of the following dose reduction techniques: automated exposure control, adjustment of the mA and/or kV according to patient size, or use of iterative reconstruction technique.       PROCEDURE: 3.0 mm axial images were obtained through the brain, to include the posterior fossa without intravenous contrast enhancement.   FINDINGS: There is symmetric volume loss of the cerebral hemispheres. Very mild decreased attenuation in the bilateral periventricular white matter,  consistent with microangiopathy is noted.  There is no encephalomalacic change. Brainstem is unremarkable. Cerebellar hemispheres are symmetric. No intra- or extra-axial mass or abnormal blood products are demonstrated.   The paranasal sinuses and mastoids as well as calvarium are unremarkable. Calvarium is unremarkable. Soft tissues are unremarkable. No posttraumatic abnormality demonstrated.       1. No acute intracranial findings. 2. Symmetric volume loss of the cerebral hemispheres. 3. Periventricular Microangiopathy. 4. No posttraumatic abnormality.   This report has been produced using speech recognition. This exam is available in DICOM format to non-affiliated healthcare facilities on a secure media free searchable basis with prior patient authorization. The patient exposure is reported to a radiation dose index registry. All CT examinations are performed with one or more of the following dose reduction techniques: Automated Exposure Control, Adjustment of mA and/or KV according to patient size, or use of iterative reconstruction techniques.   MACRO: None   Signed by: Rickey Flores 12/12/2023 9:49 PM Dictation workstation:   [id number]    Lower extremity venous duplex left    Result Date: 12/12/2023  Interpreted By:  Rickey Flores, STUDY: [facility] LOWER EXTREMITY VENOUS DUPLEX LEFT; 12/12/2023; 12/12/2023 9:18 pm   INDICATION: Signs/Symptoms:pain swelling.   ACCESSION NUMBER(S): [id number]   ORDERING CLINICIAN: JERSEY DUPREE   COMPARISON: 9 August 2020.   TECHNIQUE: PROCEDURE: Grayscale, 2D, color Doppler, Doppler spectral analysis (waveform) and duplex images obtained.   FINDINGS: Patent with normal flow, and compressibility of the common and superficial femoral, popliteal, and greater saphenous veins  as well as in the tibioperoneal trunk and trifurcation.  There is no subcutaneous edema.       No evidence of deep vein thrombosis.   Signed by: Rickey Flores 12/12/2023 9:25 PM Dictation workstation:    [id number]    XR knee left 4+ views    Result Date: 12/12/2023  Interpreted By:  Rickey Flores, STUDY: XR KNEE LEFT 4+ VIEWS; 12/12/2023 8:54 pm   INDICATION: Signs/Symptoms:pain  swelling.   COMPARISON: 21 July 2022.   ACCESSION NUMBER(S): [id number]   ORDERING CLINICIAN: JERSEY DUPREE   TECHNIQUE: AP, lateral tunnel and oblique views of the left knee were performed.   FINDINGS: There is diffuse subcutaneous edema. Chondrocalcinosis in the mediolateral compartment seen with very mild joint space narrowing. Severe patellofemoral osteoarthritis and joint space narrowing with sclerosis and osteophytosis seen. Trace suprapatellar effusion is seen. Atherosclerotic disease noted. No acute fracture or dislocation. No periostitis.       Osteoarthritis is severe in the patellofemoral joint more mild in the mediolateral compartments with chondrocalcinosis noted. Extensive subcutaneous edema is seen surrounding the knee. This is suggestive of fluid overload though an injury may have this appearance.       Signed by: Rickey Flores 12/12/2023 9:11 PM Dictation workstation:   [id number]    XR chest 2 views    Result Date: 12/12/2023  Interpreted By:  Rickey Flores, STUDY: XR CHEST 2 VIEWS; 12/12/2023 8:54 pm   INDICATION: Signs/Symptoms:dizziness.   COMPARISON: 13 August 2022   ACCESSION NUMBER(S): [id number]   ORDERING CLINICIAN: JERSEY DUPREE   TECHNIQUE: AP erect view of the chest were performed.   FINDINGS: The left lung is adequately inflated. Elevation of the right hemidiaphragm is again seen  cardiomegaly with greater left ventricular enlargement than the other chambers is unchanged. There is very mild central vascular congestion with coarse vascular markings.       No focal airspace disease with coarse vascular markings and very mild central vascular congestion with left ventricular enlargement unchanged from previous. No focal airspace disease.   Signed by: Rickey Flores 12/12/2023 9:09 PM Dictation workstation:    [id number]      Scheduled medications  cefepime, 1 g, intravenous, q12h J CARLOS  cholecalciferol, 2,000 Units, oral, Daily  cyanocobalamin, 1,000 mcg, oral, Daily  docusate sodium, 100 mg, oral, BID  gabapentin, 300 mg, oral, Nightly  letrozole, 2.5 mg, oral, Daily  lipase-protease-amylase, 1 capsule, oral, BID  oxybutynin, 5 mg, oral, BID  pantoprazole, 40 mg, oral, Daily before breakfast  polyethylene glycol, 17 g, oral, Daily      Continuous medications     PRN medications  PRN medications: acetaminophen **OR** acetaminophen **OR** acetaminophen, melatonin, polyethylene glycol  Results for orders placed or performed during the hospital encounter of 12/12/23 (from the past 24 hour(s))   Sterile Fluid Culture/Smear    Specimen: Ascites Fluid   Result Value Ref Range    Sterile Fluid Culture/Smear No growth to date     Gram Stain No polymorphonuclear leukocytes seen     Gram Stain No organisms seen    Albumin, Fluid   Result Value Ref Range    Albumin, Fluid 0.6 Not established g/dL   Body Fluid Cell Count   Result Value Ref Range    Color, Fluid Yellow Colorless, Straw, Yellow    Clarity, Fluid Clear Clear    WBC, Fluid 311 See Comment /uL    RBC, Fluid 2,000 0  /uL /uL   Body Fluid Differential   Result Value Ref Range    Neutrophils %, Manual, Fluid 6 <25 % %    Lymphocytes %, Manual, Fluid 42 <75 % %    Mono/Macrophages %, Manual, Fluid 52 <70 % %    Eosinophils %, Manual, Fluid 0 0 % %    Basophils %, Manual, Fluid 0 0 % %    Immature Granulocytes %, Manual, Fluid 0 0 % %    Blasts %, Manual, Fluid 0 0 % %    Unclassified Cells %, Manual, Fluid 0 0 % %    Plasma Cells %, Manual, Fluid 0 0 % %    Total Cells Counted, Fluid 100    Protein, Total Fluid   Result Value Ref Range    Protein, Total Fluid 1.0 Not established g/dL   Comprehensive metabolic panel   Result Value Ref Range    Glucose 107 (H) 65 - 99 mg/dL    Sodium 136 133 - 145 mmol/L    Potassium 3.6 3.4 - 5.1 mmol/L    Chloride 102 97 - 107 mmol/L     Bicarbonate 18 (L) 24 - 31 mmol/L    Urea Nitrogen 53 (H) 8 - 25 mg/dL    Creatinine 2.00 (H) 0.40 - 1.60 mg/dL    eGFR 25 (L) >60 mL/min/1.73m*2    Calcium 9.4 8.5 - 10.4 mg/dL    Albumin 3.3 (L) 3.5 - 5.0 g/dL    Alkaline Phosphatase 252 (H) 35 - 125 U/L    Total Protein 6.4 5.9 - 7.9 g/dL    AST 81 (H) 5 - 40 U/L    Bilirubin, Total 1.7 (H) 0.1 - 1.2 mg/dL    ALT 32 5 - 40 U/L    Anion Gap 16 <=19 mmol/L   Magnesium   Result Value Ref Range    Magnesium 2.10 1.60 - 3.10 mg/dL   Protime-INR   Result Value Ref Range    Protime 12.0 9.3 - 12.7 seconds    INR 1.1 0.9 - 1.2   CBC   Result Value Ref Range    WBC 8.1 4.4 - 11.3 x10*3/uL    nRBC 0.0 0.0 - 0.0 /100 WBCs    RBC 3.73 (L) 4.00 - 5.20 x10*6/uL    Hemoglobin 10.9 (L) 12.0 - 16.0 g/dL    Hematocrit 34.1 (L) 36.0 - 46.0 %    MCV 91 80 - 100 fL    MCH 29.2 26.0 - 34.0 pg    MCHC 32.0 32.0 - 36.0 g/dL    RDW 16.7 (H) 11.5 - 14.5 %    Platelets 142 (L) 150 - 450 x10*3/uL           Assessment/Plan   Principal Problem:    Acute kidney injury (CMS/HCC)  Active Problems:    Hypokalemia    Lower extremity edema    Transaminasemia    Ascites       Cirrhosis of the liver with ascites  Elevated liver enzymes  Meld score 18     HCC- AFP elevated at 32. May need MRI liver   HE: Monitor mental status, A/O x 3 no lactulose needed   EV: monitor for varices, no overt bleeding. last EGD 9/2021   Ascites: CT shows ascites seen in the pelvis and each colonic  gutter as well as surrounding the liver and spleen. Defer diuretics to nephrology . Has been on lasix/aldactone   S/p paracentesis yesterday with 100 ml fluid   Hepatitis panel negative  Low sodium diet   Monitor LFT's   Supportive care     Acute on chronic renal failure   Managed per nephrology     Nathalie Copeland, APRN-CNP

## 2023-12-15 LAB
ALBUMIN SERPL-MCNC: 3.2 G/DL (ref 3.5–5)
ALP BLD-CCNC: 249 U/L (ref 35–125)
ALT SERPL-CCNC: 32 U/L (ref 5–40)
ANION GAP SERPL CALC-SCNC: 13 MMOL/L
AST SERPL-CCNC: 83 U/L (ref 5–40)
BILIRUB SERPL-MCNC: 1.6 MG/DL (ref 0.1–1.2)
BUN SERPL-MCNC: 51 MG/DL (ref 8–25)
CALCIUM SERPL-MCNC: 9.4 MG/DL (ref 8.5–10.4)
CHLORIDE SERPL-SCNC: 104 MMOL/L (ref 97–107)
CO2 SERPL-SCNC: 20 MMOL/L (ref 24–31)
CREAT SERPL-MCNC: 1.6 MG/DL (ref 0.4–1.6)
GFR SERPL CREATININE-BSD FRML MDRD: 33 ML/MIN/1.73M*2
GLUCOSE SERPL-MCNC: 117 MG/DL (ref 65–99)
HOLD SPECIMEN: NORMAL
INR PPP: 1.1 (ref 0.9–1.2)
POTASSIUM SERPL-SCNC: 3.6 MMOL/L (ref 3.4–5.1)
PROT SERPL-MCNC: 6.1 G/DL (ref 5.9–7.9)
PROTHROMBIN TIME: 12 SECONDS (ref 9.3–12.7)
SODIUM SERPL-SCNC: 137 MMOL/L (ref 133–145)

## 2023-12-15 PROCEDURE — 36415 COLL VENOUS BLD VENIPUNCTURE: CPT | Performed by: INTERNAL MEDICINE

## 2023-12-15 PROCEDURE — 2500000001 HC RX 250 WO HCPCS SELF ADMINISTERED DRUGS (ALT 637 FOR MEDICARE OP): Performed by: INTERNAL MEDICINE

## 2023-12-15 PROCEDURE — 97110 THERAPEUTIC EXERCISES: CPT | Mod: GP

## 2023-12-15 PROCEDURE — 85610 PROTHROMBIN TIME: CPT | Performed by: INTERNAL MEDICINE

## 2023-12-15 PROCEDURE — 84075 ASSAY ALKALINE PHOSPHATASE: CPT | Performed by: INTERNAL MEDICINE

## 2023-12-15 PROCEDURE — 97166 OT EVAL MOD COMPLEX 45 MIN: CPT | Mod: GO

## 2023-12-15 PROCEDURE — 97530 THERAPEUTIC ACTIVITIES: CPT | Mod: GP

## 2023-12-15 PROCEDURE — 2500000004 HC RX 250 GENERAL PHARMACY W/ HCPCS (ALT 636 FOR OP/ED): Performed by: INTERNAL MEDICINE

## 2023-12-15 PROCEDURE — 1200000002 HC GENERAL ROOM WITH TELEMETRY DAILY

## 2023-12-15 PROCEDURE — 2500000005 HC RX 250 GENERAL PHARMACY W/O HCPCS: Performed by: INTERNAL MEDICINE

## 2023-12-15 PROCEDURE — 2500000004 HC RX 250 GENERAL PHARMACY W/ HCPCS (ALT 636 FOR OP/ED): Performed by: NURSE PRACTITIONER

## 2023-12-15 RX ORDER — SULFAMETHOXAZOLE AND TRIMETHOPRIM 800; 160 MG/1; MG/1
160 TABLET ORAL DAILY
Status: DISCONTINUED | OUTPATIENT
Start: 2023-12-15 | End: 2023-12-16 | Stop reason: HOSPADM

## 2023-12-15 RX ORDER — LIDOCAINE 560 MG/1
1 PATCH PERCUTANEOUS; TOPICAL; TRANSDERMAL DAILY
Status: DISCONTINUED | OUTPATIENT
Start: 2023-12-15 | End: 2023-12-16 | Stop reason: HOSPADM

## 2023-12-15 RX ORDER — FUROSEMIDE 40 MG/1
40 TABLET ORAL DAILY
Status: DISCONTINUED | OUTPATIENT
Start: 2023-12-15 | End: 2023-12-16 | Stop reason: HOSPADM

## 2023-12-15 RX ADMIN — GABAPENTIN 300 MG: 300 CAPSULE ORAL at 21:49

## 2023-12-15 RX ADMIN — DOCUSATE SODIUM 100 MG: 100 CAPSULE, LIQUID FILLED ORAL at 10:00

## 2023-12-15 RX ADMIN — SULFAMETHOXAZOLE AND TRIMETHOPRIM 160 MG: 800; 160 TABLET ORAL at 12:33

## 2023-12-15 RX ADMIN — PANCRELIPASE 1 CAPSULE: 24000; 76000; 120000 CAPSULE, DELAYED RELEASE PELLETS ORAL at 10:01

## 2023-12-15 RX ADMIN — Medication 2000 UNITS: at 10:00

## 2023-12-15 RX ADMIN — LETROZOLE 2.5 MG: 2.5 TABLET ORAL at 10:00

## 2023-12-15 RX ADMIN — PANCRELIPASE 1 CAPSULE: 24000; 76000; 120000 CAPSULE, DELAYED RELEASE PELLETS ORAL at 21:49

## 2023-12-15 RX ADMIN — CEFEPIME 1 G: 1 INJECTION, POWDER, FOR SOLUTION INTRAMUSCULAR; INTRAVENOUS at 04:56

## 2023-12-15 RX ADMIN — DOCUSATE SODIUM 100 MG: 100 CAPSULE, LIQUID FILLED ORAL at 21:49

## 2023-12-15 RX ADMIN — CYANOCOBALAMIN TAB 1000 MCG 1000 MCG: 1000 TAB at 10:00

## 2023-12-15 RX ADMIN — POLYETHYLENE GLYCOL 3350 17 G: 17 POWDER, FOR SOLUTION ORAL at 10:00

## 2023-12-15 RX ADMIN — OXYBUTYNIN CHLORIDE 5 MG: 5 TABLET ORAL at 10:00

## 2023-12-15 RX ADMIN — PANTOPRAZOLE SODIUM 40 MG: 40 TABLET, DELAYED RELEASE ORAL at 05:06

## 2023-12-15 RX ADMIN — CEFEPIME 1 G: 1 INJECTION, POWDER, FOR SOLUTION INTRAMUSCULAR; INTRAVENOUS at 17:24

## 2023-12-15 RX ADMIN — OXYBUTYNIN CHLORIDE 5 MG: 5 TABLET ORAL at 21:49

## 2023-12-15 RX ADMIN — LIDOCAINE 1 PATCH: 4 PATCH TOPICAL at 12:35

## 2023-12-15 RX ADMIN — FUROSEMIDE 40 MG: 40 TABLET ORAL at 11:12

## 2023-12-15 ASSESSMENT — COGNITIVE AND FUNCTIONAL STATUS - GENERAL
MOVING TO AND FROM BED TO CHAIR: A LOT
WALKING IN HOSPITAL ROOM: A LOT
DRESSING REGULAR LOWER BODY CLOTHING: TOTAL
MOVING FROM LYING ON BACK TO SITTING ON SIDE OF FLAT BED WITH BEDRAILS: A LOT
MOVING TO AND FROM BED TO CHAIR: A LOT
TURNING FROM BACK TO SIDE WHILE IN FLAT BAD: A LOT
TURNING FROM BACK TO SIDE WHILE IN FLAT BAD: A LOT
TOILETING: TOTAL
MOBILITY SCORE: 10
DRESSING REGULAR LOWER BODY CLOTHING: TOTAL
TURNING FROM BACK TO SIDE WHILE IN FLAT BAD: A LOT
DAILY ACTIVITIY SCORE: 11
STANDING UP FROM CHAIR USING ARMS: A LOT
WALKING IN HOSPITAL ROOM: A LOT
TOILETING: TOTAL
DRESSING REGULAR UPPER BODY CLOTHING: A LOT
EATING MEALS: TOTAL
MOVING TO AND FROM BED TO CHAIR: A LOT
WALKING IN HOSPITAL ROOM: TOTAL
TOILETING: TOTAL
DRESSING REGULAR LOWER BODY CLOTHING: TOTAL
CLIMB 3 TO 5 STEPS WITH RAILING: A LOT
PERSONAL GROOMING: TOTAL
CLIMB 3 TO 5 STEPS WITH RAILING: A LOT
MOVING FROM LYING ON BACK TO SITTING ON SIDE OF FLAT BED WITH BEDRAILS: A LOT
DRESSING REGULAR UPPER BODY CLOTHING: A LOT
STANDING UP FROM CHAIR USING ARMS: A LOT
EATING MEALS: A LITTLE
PERSONAL GROOMING: A LITTLE
PERSONAL GROOMING: TOTAL
DAILY ACTIVITIY SCORE: 8
HELP NEEDED FOR BATHING: A LOT
HELP NEEDED FOR BATHING: A LOT
DRESSING REGULAR UPPER BODY CLOTHING: A LOT
HELP NEEDED FOR BATHING: A LOT
MOBILITY SCORE: 12
MOVING FROM LYING ON BACK TO SITTING ON SIDE OF FLAT BED WITH BEDRAILS: A LOT
MOBILITY SCORE: 12
STANDING UP FROM CHAIR USING ARMS: A LOT
CLIMB 3 TO 5 STEPS WITH RAILING: TOTAL
DAILY ACTIVITIY SCORE: 12

## 2023-12-15 ASSESSMENT — PAIN SCALES - GENERAL
PAINLEVEL_OUTOF10: 3

## 2023-12-15 ASSESSMENT — PAIN - FUNCTIONAL ASSESSMENT
PAIN_FUNCTIONAL_ASSESSMENT: 0-10
PAIN_FUNCTIONAL_ASSESSMENT: 0-10

## 2023-12-15 ASSESSMENT — ACTIVITIES OF DAILY LIVING (ADL)
ADL_ASSISTANCE: INDEPENDENT
BATHING_ASSISTANCE: MAXIMAL

## 2023-12-15 NOTE — PROGRESS NOTES
Patient not medically clear. Patient followed by GI and nephrology. TCC spoke with patient to discuss discharge plans. Patient declining SNF. Patient agreeable to HHC. Patient requested that her son assist with choosing a HHC agency. [facility] left a message for Rodger and I have not received a return call. If patient is ready for discharge this weekend, HHC will need to be set up first. Will follow.     **PATIENT DOES NOT HAVE A SAFE DISCHARGE PLAN, NEEDS C SET UP      Beatriz Trevino RN

## 2023-12-15 NOTE — PROGRESS NOTES
Kassandra Warner is a 78 y.o. female on day 2 of admission presenting with Acute kidney injury (CMS/HCC).    Subjective     Denies abdominal pain, nausea, vomiting     Objective     Physical Exam  HENT:      Head: Normocephalic.      Nose: Nose normal.      Mouth/Throat:      Mouth: Mucous membranes are moist.   Eyes:      Pupils: Pupils are equal, round, and reactive to light.   Cardiovascular:      Rate and Rhythm: Normal rate.   Pulmonary:      Effort: Pulmonary effort is normal.   Abdominal:      General: There is distension.      Palpations: Abdomen is soft.   Musculoskeletal:         General: Normal range of motion.      Cervical back: Normal range of motion.   Skin:     General: Skin is warm.   Neurological:      General: No focal deficit present.      Mental Status: She is alert.   Psychiatric:         Mood and Affect: Mood normal.         Last Recorded Vitals  Blood pressure 122/57, pulse 64, temperature 36.8 °C (98.2 °F), temperature source Oral, resp. rate 18, height 1.524 m (5'), weight 63.5 kg (140 lb), SpO2 97 %.  Intake/Output last 3 Shifts:  No intake/output data recorded.    Relevant Results  US guided abdominal paracentesis    Result Date: 12/13/2023  Interpreted By:  Loli Hong, STUDY: US GUIDED ABDOMINAL PARACENTESIS; 12/13/2023 12:12 pm   INDICATION: Signs/Symptoms:Ascites.   COMPARISON: CT scan dated 12/12/2023   ACCESSION NUMBER(S): [id number]   ORDERING CLINICIAN: RENEE VILLANUEVA   TECHNIQUE: Ultrasound-guided paracentesis   FINDINGS: Informed consent obtained. Patient positioned supine. Left lower quadrant prepped, draped and anesthetized. Under ultrasound guidance, 8 [place] centesis sheath needle inserted into peritoneal cavity. 100 mLof clear yellowfluid aspiratedwith sample sent for analysis. Patient tolerated procedure well       Ultrasound-guided diagnostic paracentesis.   Signed by: Loli Hong 12/13/2023 1:47 PM Dictation workstation:   [id number]    ECG 12  lead    Result Date: 12/13/2023  Sinus rhythm with Premature atrial complexes Left axis deviation Low voltage QRS Inferior infarct , age undetermined Possible Anterolateral infarct , age undetermined Prolonged QT Abnormal ECG No previous ECGs available    CT abdomen pelvis wo IV contrast    Result Date: 12/12/2023  Interpreted By:  Rickey Flores, STUDY: CT ABDOMEN PELVIS WO IV CONTRAST; 12/12/2023 9:39 pm   INDICATION: Signs/Symptoms:Abdominal pain.   COMPARISON: 19 September 2023   ACCESSION NUMBER(S): [id number]   ORDERING CLINICIAN: JERSEY DUPREE   TECHNIQUE: CT of the Abdomen and Pelvis was performed. Axial, sagittal and coronal reformatted images were reviewed.   PROCEDURE: Helical CT images were obtained through the abdomen and pelvis at 3 mm collimation.   CT was performed with one or more of the following dose reduction techniques: automated exposure control, adjustment of the mA and/or kV according to patient size, or use of iterative reconstruction technique.   FINDINGS: Chest: Mild dependent atelectasis without infiltrate or effusion. Calcified aortic valve and mitral valve demonstrated.   Esophagus, stomach, duodenum and small bowel: Distal esophagus is slightly patulous with small hiatal hernia. The stomach is incompletely distended but unremarkable. The duodenum is without abnormality. No dilated small bowel loops seen.   Pancreas:  There is atrophy of the pancreas without focal lesion. No cyst or mass in the pancreas.   Liver: Nodular surface contour with tiny subcentimeter low-attenuation lesion anterior segment right hepatic lobe likely representing cysts but too small to further characterize seen. Ascites surrounds the liver.   Gallbladder and bile ducts: Post cholecystectomy without choledocholithiasis or intra or extrahepatic ductal dilatation.   Adrenal Glands: No abnormality of either adrenal.   Spleen: Splenomegaly demonstrated with tortuous dilated splenic vein. Ascites is seen adjacent to  the spleen.   Kidneys and ureters: No obstructive uropathy. No nephrolithiasis. No ureterolithiasis.   Urinary Bladder: Beam hardening artifact from the left hip arthroplasty obscures the incompletely urine distended bladder but no focal abnormality of the bladder demonstrated.   Appendix: No CT evidence of appendicitis.   Large Bowel: Mild ascending, very mild transverse and mild descending and rectosigmoid colonic stool burden. No stercoral ulcer or focal thickening of the walls of large bowel. There is extensive distal descending to sigmoid diverticulosis without focal diverticulitis with a caliber change that may be due to peristalsis without wall thickening of the mid sigmoid colon from air distension to a narrowed lumen but again without wall thickening.   Intraperitoneal space: Ascites is seen in the pelvis and each colonic gutter as well as surrounding the liver and spleen consistent with sequelae of cirrhosis.   Vasculature: Moderate atherosclerotic disease of the nondilated abdominal aorta and iliac vessels is again seen.   Lymph Nodes: No inguinal, pelvic sidewall, retroperitoneal or mesenteric adenopathy with small lymph node seen amongst the mild central ascites.   Reproductive Organs: Calcified fibroid seen. Adnexa are unremarkable.   Bones/Joints: Discogenic degenerative disease with vacuum phenomenon multiple levels of the lower thoracic and lumbar spine with disc height loss without wedge compression or compression injury. Artifact from left hip arthroplasty.   Soft Tissues: Thin abdominal wall without abdominal wall defect. Levoscoliosis of the lumbar spine. Severe degenerative change of the right hip without acute osseous abnormality. Left hip arthroplasty is incompletely seen with the portions visualized appear intact given the artifacts. There is diffuse mild to moderate anasarca.       1. Cirrhosis with ascites and anasarca now seen compared to the prior exam. 2. Colonic diverticulosis without  diverticulitis. No obstruction or ileus. No enteritis or colitis. No stercoral ulcer with much less stool throughout the large bowel compared to the prior study. Much less stool in the rectum compared to the prior study. 3. Small hiatal hernia. 4. No obstructive uropathy. 5. No choledocholithiasis or ductal dilatation. 6. No adenopathy throughout the examination.         This report has been produced using speech recognition. This exam is available in DICOM format to non-affiliated healthcare facilities on a secure media free searchable basis with prior patient authorization. The patient exposure is reported to a radiation dose index registry. All CT examinations are performed with one or more of the following dose reduction techniques: Automated Exposure Control, Adjustment of mA and/or KV according to patient size, or use of iterative reconstruction techniques.   Signed by: Rickey Flores 12/12/2023 9:58 PM Dictation workstation:   [id number]    CT head wo IV contrast    Result Date: 12/12/2023  Interpreted By:  Rickey Flores, STUDY: CT HEAD WO IV CONTRAST; 12/12/2023 9:38 pm   INDICATION: fall /  felling off balance.   COMPARISON: 8 August 2020.   ACCESSION NUMBER(S): [id number]   ORDERING CLINICIAN: JERSEY DUPREE   TECHNIQUE: CT was performed with one or more of the following dose reduction techniques: automated exposure control, adjustment of the mA and/or kV according to patient size, or use of iterative reconstruction technique.       PROCEDURE: 3.0 mm axial images were obtained through the brain, to include the posterior fossa without intravenous contrast enhancement.   FINDINGS: There is symmetric volume loss of the cerebral hemispheres. Very mild decreased attenuation in the bilateral periventricular white matter, consistent with microangiopathy is noted.  There is no encephalomalacic change. Brainstem is unremarkable. Cerebellar hemispheres are symmetric. No intra- or extra-axial mass or abnormal blood  products are demonstrated.   The paranasal sinuses and mastoids as well as calvarium are unremarkable. Calvarium is unremarkable. Soft tissues are unremarkable. No posttraumatic abnormality demonstrated.       1. No acute intracranial findings. 2. Symmetric volume loss of the cerebral hemispheres. 3. Periventricular Microangiopathy. 4. No posttraumatic abnormality.   This report has been produced using speech recognition. This exam is available in DICOM format to non-affiliated healthcare facilities on a secure media free searchable basis with prior patient authorization. The patient exposure is reported to a radiation dose index registry. All CT examinations are performed with one or more of the following dose reduction techniques: Automated Exposure Control, Adjustment of mA and/or KV according to patient size, or use of iterative reconstruction techniques.   MACRO: None   Signed by: Rickey Flores 12/12/2023 9:49 PM Dictation workstation:   [id number]    Lower extremity venous duplex left    Result Date: 12/12/2023  Interpreted By:  Rickey Flores, STUDY: [facility] LOWER EXTREMITY VENOUS DUPLEX LEFT; 12/12/2023; 12/12/2023 9:18 pm   INDICATION: Signs/Symptoms:pain swelling.   ACCESSION NUMBER(S): [id number]   ORDERING CLINICIAN: JERSEY DUPREE   COMPARISON: 9 August 2020.   TECHNIQUE: PROCEDURE: Grayscale, 2D, color Doppler, Doppler spectral analysis (waveform) and duplex images obtained.   FINDINGS: Patent with normal flow, and compressibility of the common and superficial femoral, popliteal, and greater saphenous veins  as well as in the tibioperoneal trunk and trifurcation.  There is no subcutaneous edema.       No evidence of deep vein thrombosis.   Signed by: Rickey Flores 12/12/2023 9:25 PM Dictation workstation:   [id number]    XR knee left 4+ views    Result Date: 12/12/2023  Interpreted By:  Rickey Flores, STUDY: XR KNEE LEFT 4+ VIEWS; 12/12/2023 8:54 pm   INDICATION: Signs/Symptoms:pain  swelling.    COMPARISON: 21 July 2022.   ACCESSION NUMBER(S): [id number]   ORDERING CLINICIAN: JERSEY DUPREE   TECHNIQUE: AP, lateral tunnel and oblique views of the left knee were performed.   FINDINGS: There is diffuse subcutaneous edema. Chondrocalcinosis in the mediolateral compartment seen with very mild joint space narrowing. Severe patellofemoral osteoarthritis and joint space narrowing with sclerosis and osteophytosis seen. Trace suprapatellar effusion is seen. Atherosclerotic disease noted. No acute fracture or dislocation. No periostitis.       Osteoarthritis is severe in the patellofemoral joint more mild in the mediolateral compartments with chondrocalcinosis noted. Extensive subcutaneous edema is seen surrounding the knee. This is suggestive of fluid overload though an injury may have this appearance.       Signed by: Rickey Flores 12/12/2023 9:11 PM Dictation workstation:   [id number]    XR chest 2 views    Result Date: 12/12/2023  Interpreted By:  Rickey Flores, STUDY: XR CHEST 2 VIEWS; 12/12/2023 8:54 pm   INDICATION: Signs/Symptoms:dizziness.   COMPARISON: 13 August 2022   ACCESSION NUMBER(S): [id number]   ORDERING CLINICIAN: JERSEY DUPREE   TECHNIQUE: AP erect view of the chest were performed.   FINDINGS: The left lung is adequately inflated. Elevation of the right hemidiaphragm is again seen  cardiomegaly with greater left ventricular enlargement than the other chambers is unchanged. There is very mild central vascular congestion with coarse vascular markings.       No focal airspace disease with coarse vascular markings and very mild central vascular congestion with left ventricular enlargement unchanged from previous. No focal airspace disease.   Signed by: Rickey Flores 12/12/2023 9:09 PM Dictation workstation:   [id number]      Scheduled medications  cefepime, 1 g, intravenous, q12h J CARLOS  cholecalciferol, 2,000 Units, oral, Daily  cyanocobalamin, 1,000 mcg, oral, Daily  docusate sodium, 100 mg, oral,  BID  furosemide, 40 mg, oral, Daily  gabapentin, 300 mg, oral, Nightly  letrozole, 2.5 mg, oral, Daily  lidocaine, 1 patch, transdermal, Daily  lipase-protease-amylase, 1 capsule, oral, BID  oxybutynin, 5 mg, oral, BID  pantoprazole, 40 mg, oral, Daily before breakfast  polyethylene glycol, 17 g, oral, Daily      Continuous medications     PRN medications  PRN medications: acetaminophen **OR** acetaminophen **OR** acetaminophen, melatonin, polyethylene glycol  Results for orders placed or performed during the hospital encounter of 12/12/23 (from the past 24 hour(s))   Lavender Top   Result Value Ref Range    Extra Tube Hold for add-ons.    Protime-INR   Result Value Ref Range    Protime 12.0 9.3 - 12.7 seconds    INR 1.1 0.9 - 1.2   Comprehensive metabolic panel   Result Value Ref Range    Glucose 117 (H) 65 - 99 mg/dL    Sodium 137 133 - 145 mmol/L    Potassium 3.6 3.4 - 5.1 mmol/L    Chloride 104 97 - 107 mmol/L    Bicarbonate 20 (L) 24 - 31 mmol/L    Urea Nitrogen 51 (H) 8 - 25 mg/dL    Creatinine 1.60 0.40 - 1.60 mg/dL    eGFR 33 (L) >60 mL/min/1.73m*2    Calcium 9.4 8.5 - 10.4 mg/dL    Albumin 3.2 (L) 3.5 - 5.0 g/dL    Alkaline Phosphatase 249 (H) 35 - 125 U/L    Total Protein 6.1 5.9 - 7.9 g/dL    AST 83 (H) 5 - 40 U/L    Bilirubin, Total 1.6 (H) 0.1 - 1.2 mg/dL    ALT 32 5 - 40 U/L    Anion Gap 13 <=19 mmol/L           Assessment/Plan   Principal Problem:    Acute kidney injury (CMS/HCC)  Active Problems:    Hypokalemia    Lower extremity edema    Transaminasemia    Ascites       Cirrhosis of the liver with ascites  Elevated liver enzymes  Meld score 18     HCC- AFP elevated at 32. May need MRI liver   HE: Monitor mental status, A/O x 3 no lactulose needed   EV: monitor for varices, no overt bleeding. last EGD 9/2021   Ascites: CT shows ascites seen in the pelvis and each colonic  gutter as well as surrounding the liver and spleen. Defer diuretics to nephrology . Has been on lasix/aldactone   S/p paracentesis  yesterday with 100 ml fluid. Neg SBP   Hepatitis panel negative  Low sodium diet   Monitor LFT's   Supportive care     Acute on chronic renal failure   Managed per nephrology     Given ascites fluid protein less than 1.5 with renal impairment, reasonable to start SBP prophylaxis. Recommend 1 tablet Bactrim daily, indefinitely      Jenny Pitts, APRN-CNP

## 2023-12-15 NOTE — CARE PLAN
The patient's goals for the shift include      The clinical goals for the shift include continuing antibiotics      Problem: Pain - Adult  Goal: Verbalizes/displays adequate comfort level or baseline comfort level  Outcome: Progressing     Problem: Safety - Adult  Goal: Free from fall injury  Outcome: Progressing     Problem: Discharge Planning  Goal: Discharge to home or other facility with appropriate resources  Outcome: Progressing     Problem: Chronic Conditions and Co-morbidities  Goal: Patient's chronic conditions and co-morbidity symptoms are monitored and maintained or improved  Outcome: Progressing     Problem: Skin  Goal: Decreased wound size/increased tissue granulation at next dressing change  Outcome: Progressing  Flowsheets (Taken 12/15/2023 0344)  Decreased wound size/increased tissue granulation at next dressing change:   Promote sleep for wound healing   Protective dressings over bony prominences  Goal: Participates in plan/prevention/treatment measures  Outcome: Progressing  Flowsheets (Taken 12/15/2023 0344)  Participates in plan/prevention/treatment measures:   Elevate heels   Discuss with provider PT/OT consult  Goal: Prevent/manage excess moisture  Outcome: Progressing  Flowsheets (Taken 12/15/2023 0344)  Prevent/manage excess moisture:   Cleanse incontinence/protect with barrier cream   Follow provider orders for dressing changes  Goal: Prevent/minimize sheer/friction injuries  Outcome: Progressing  Flowsheets (Taken 12/15/2023 0344)  Prevent/minimize sheer/friction injuries:   Use pull sheet   Complete micro-shifts as needed if patient unable. Adjust patient position to relieve pressure points, not a full turn   Turn/reposition every 2 hours/use positioning/transfer devices   HOB 30 degrees or less  Goal: Promote/optimize nutrition  Outcome: Progressing  Flowsheets (Taken 12/15/2023 0344)  Promote/optimize nutrition: Consume > 50% meals/supplements  Goal: Promote skin healing  Outcome:  Progressing  Flowsheets (Taken 12/15/2023 2860)  Promote skin healing:   Turn/reposition every 2 hours/use positioning/transfer devices   Protective dressings over bony prominences     Problem: Fall/Injury  Goal: Not fall by end of shift  Outcome: Progressing  Goal: Be free from injury by end of the shift  Outcome: Progressing  Goal: Verbalize understanding of personal risk factors for fall in the hospital  Outcome: Progressing  Goal: Verbalize understanding of risk factor reduction measures to prevent injury from fall in the home  Outcome: Progressing  Goal: Use assistive devices by end of the shift  Outcome: Progressing  Goal: Pace activities to prevent fatigue by end of the shift  Outcome: Progressing

## 2023-12-15 NOTE — NURSING NOTE
Assumed care of pt at this time. Pt in bed resting with son at bedside. No s/s of pain or distress. Denies having needs at this time.

## 2023-12-15 NOTE — PROGRESS NOTES
CONSULT PROGRESS NOTES    SERVICE DATE: 12/15/2023   SERVICE TIME: 10:39 AM    CONSULTING SERVICE: Nephrology    ASSESSMENT AND PLAN   78-year-old with multiple medical problems including cirrhosis admitted with acute on chronic renal failure in the context of numerous falls.  1.  Acute renal failure  2.  Chronic metabolic acidosis  3.  Chronic kidney disease stage IIIb  4.  Essential hypertension  5.  Cellulitis     Acute renal failure on top of chronic kidney disease stage IIIb with baseline creatinine underlying around 1.2-1.6 range.  Her serum creatinine is on the upper limit of this right now.  She does have evidence of peripheral volume overload, ascites, however she has a BNP that is not elevated and she is in no respiratory distress.  I would continue to hold losartan, but okay to resume furosemide, use 40 mg daily.  I am not sure that she benefits from hydrochlorothiazide at this point.  Blood pressure stable.  Hold spironolactone, unclear if she was on this.  She is on treatment for SBP, this should cover the lower extremity cellulitis.    Trend daily labs, continue supportive care.    Continue supportive care otherwise.  Case discussed with Dr. William.  No major objection to discharge from my perspective.  Use furosemide 40 mg p.o. daily at discharge for now.    SUBJECTIVE  INTERVAL HPI: She has no new issues or complaints.  She inquires about discharge.  She has no abdominal pain, no diarrhea, no nausea, no vomiting, no dysuria.    MEDICATIONS:  cefepime, 1 g, intravenous, q12h J CARLOS  cholecalciferol, 2,000 Units, oral, Daily  cyanocobalamin, 1,000 mcg, oral, Daily  docusate sodium, 100 mg, oral, BID  gabapentin, 300 mg, oral, Nightly  letrozole, 2.5 mg, oral, Daily  lipase-protease-amylase, 1 capsule, oral, BID  oxybutynin, 5 mg, oral, BID  pantoprazole, 40 mg, oral, Daily before breakfast  polyethylene glycol, 17 g, oral, Daily           PRN medications: acetaminophen **OR** acetaminophen **OR**  acetaminophen, melatonin, polyethylene glycol     OBJECTIVE  PHYSICAL EXAM:   Heart Rate:  [63-64]   Temp:  [36.4 °C (97.5 °F)-36.8 °C (98.2 °F)]   Resp:  [16-18]   BP: (106-134)/(44-57)   SpO2:  [95 %-97 %]   Body mass index is 27.34 kg/m².  Obese white woman, appearing chronically ill, no acute distress  Spider angiomata present, no scleral icterus or jaundice  Hearing intact  Phonation intact  Very dry oral mucosa  Harsh systolic murmur  Lungs are clear to auscultation bilaterally  Abdomen is soft, mildly distended, nontender, positive bowel sounds  External urinary catheter in place, no suprapubic tenderness to palpation  Bilateral lower extremity pitting edema with significant erythema and worsening of her left lower extremity, tender to palpation  Moves 4 limbs spontaneously  No obvious joint deformities  No lymphadenopathy    DATA:   Labs:  Results for orders placed or performed during the hospital encounter of 12/12/23 (from the past 96 hour(s))   ECG 12 lead   Result Value Ref Range    Ventricular Rate 90 BPM    Atrial Rate 90 BPM    SC Interval 180 ms    QRS Duration 100 ms    QT Interval 412 ms    QTC Calculation(Bazett) 504 ms    P Axis -5 degrees    R Axis -59 degrees    T Axis 60 degrees    QRS Count 14 beats    Q Onset 215 ms    P Onset 125 ms    P Offset 183 ms    T Offset 421 ms    QTC Fredericia 471 ms   CBC and Auto Differential   Result Value Ref Range    WBC 8.7 4.4 - 11.3 x10*3/uL    nRBC 0.0 0.0 - 0.0 /100 WBCs    RBC 4.02 4.00 - 5.20 x10*6/uL    Hemoglobin 11.6 (L) 12.0 - 16.0 g/dL    Hematocrit 35.3 (L) 36.0 - 46.0 %    MCV 88 80 - 100 fL    MCH 28.9 26.0 - 34.0 pg    MCHC 32.9 32.0 - 36.0 g/dL    RDW 16.3 (H) 11.5 - 14.5 %    Platelets 171 150 - 450 x10*3/uL    Neutrophils % 78.9 40.0 - 80.0 %    Immature Granulocytes %, Automated 0.3 0.0 - 0.9 %    Lymphocytes % 8.4 13.0 - 44.0 %    Monocytes % 11.1 2.0 - 10.0 %    Eosinophils % 1.0 0.0 - 6.0 %    Basophils % 0.3 0.0 - 2.0 %    Neutrophils  Absolute 6.83 (H) 1.60 - 5.50 x10*3/uL    Immature Granulocytes Absolute, Automated 0.03 0.00 - 0.50 x10*3/uL    Lymphocytes Absolute 0.73 (L) 0.80 - 3.00 x10*3/uL    Monocytes Absolute 0.96 (H) 0.05 - 0.80 x10*3/uL    Eosinophils Absolute 0.09 0.00 - 0.40 x10*3/uL    Basophils Absolute 0.03 0.00 - 0.10 x10*3/uL   Comprehensive metabolic panel   Result Value Ref Range    Glucose 136 (H) 65 - 99 mg/dL    Sodium 134 133 - 145 mmol/L    Potassium 3.1 (L) 3.4 - 5.1 mmol/L    Chloride 100 97 - 107 mmol/L    Bicarbonate 20 (L) 24 - 31 mmol/L    Urea Nitrogen 47 (H) 8 - 25 mg/dL    Creatinine 2.40 (H) 0.40 - 1.60 mg/dL    eGFR 20 (L) >60 mL/min/1.73m*2    Calcium 9.5 8.5 - 10.4 mg/dL    Albumin 3.7 3.5 - 5.0 g/dL    Alkaline Phosphatase 254 (H) 35 - 125 U/L    Total Protein 6.8 5.9 - 7.9 g/dL    AST 88 (H) 5 - 40 U/L    Bilirubin, Total 2.1 (H) 0.1 - 1.2 mg/dL    ALT 35 5 - 40 U/L    Anion Gap 14 <=19 mmol/L   Lipase   Result Value Ref Range    Lipase 37 16 - 63 U/L   NT Pro-BNP   Result Value Ref Range    PROBNP 216 0 - 624 pg/mL   Serial Troponin, Initial ([facility])   Result Value Ref Range    Troponin T, High Sensitivity 27 (H) <=15 ng/L   Magnesium   Result Value Ref Range    Magnesium 2.10 1.60 - 3.10 mg/dL   Serial Troponin, 6 Hour ([facility])   Result Value Ref Range    Troponin T, High Sensitivity 26 (H) <=15 ng/L   Sars-CoV-2 and Influenza A/B PCR   Result Value Ref Range    Flu A Result Not Detected Not Detected    Flu B Result Not Detected Not Detected    Coronavirus 2019, PCR Not Detected Not Detected   Urinalysis with Reflex Microscopic   Result Value Ref Range    Color, Urine Yellow Light-Yellow, Yellow, Dark-Yellow    Appearance, Urine Clear Clear    Specific Gravity, Urine 1.012 1.005 - 1.035    pH, Urine 5.5 5.0, 5.5, 6.0, 6.5, 7.0, 7.5, 8.0    Protein, Urine 10 (TRACE) NEGATIVE, 10 (TRACE), 20 (TRACE) mg/dL    Glucose, Urine Normal Normal mg/dL    Blood, Urine NEGATIVE NEGATIVE    Ketones, Urine NEGATIVE NEGATIVE  mg/dL    Bilirubin, Urine NEGATIVE NEGATIVE    Urobilinogen, Urine Normal Normal mg/dL    Nitrite, Urine NEGATIVE NEGATIVE    Leukocyte Esterase, Urine NEGATIVE NEGATIVE   Alcohol   Result Value Ref Range    Alcohol <0.010 0.000 - 0.010 g/dL   Ammonia   Result Value Ref Range    Ammonia 80 (H) 12 - 45 umol/L   Microscopic Only, Urine   Result Value Ref Range    WBC, Urine 1-5 1-5, NONE /HPF    RBC, Urine 1-2 NONE, 1-2, 3-5 /HPF    Squamous Epithelial Cells, Urine 1-9 (SPARSE) Reference range not established. /HPF    Transitional Epithelial Cells, Urine 1-2 (FEW) Reference range not established. /HPF    Mucus, Urine FEW Reference range not established. /LPF    Hyaline Casts, Urine 4+ (A) NONE /LPF   Comprehensive Metabolic Panel   Result Value Ref Range    Glucose 97 65 - 99 mg/dL    Sodium 136 133 - 145 mmol/L    Potassium 3.9 3.4 - 5.1 mmol/L    Chloride 104 97 - 107 mmol/L    Bicarbonate 18 (L) 24 - 31 mmol/L    Urea Nitrogen 47 (H) 8 - 25 mg/dL    Creatinine 2.10 (H) 0.40 - 1.60 mg/dL    eGFR 24 (L) >60 mL/min/1.73m*2    Calcium 9.4 8.5 - 10.4 mg/dL    Albumin 3.3 (L) 3.5 - 5.0 g/dL    Alkaline Phosphatase 249 (H) 35 - 125 U/L    Total Protein 6.3 5.9 - 7.9 g/dL    AST 85 (H) 5 - 40 U/L    Bilirubin, Total 1.9 (H) 0.1 - 1.2 mg/dL    ALT 33 5 - 40 U/L    Anion Gap 14 <=19 mmol/L   Protime-INR   Result Value Ref Range    Protime 11.8 9.3 - 12.7 seconds    INR 1.1 0.9 - 1.2   aPTT   Result Value Ref Range    aPTT 27.7 22.0 - 32.5 seconds   CBC   Result Value Ref Range    WBC 7.0 4.4 - 11.3 x10*3/uL    nRBC 0.0 0.0 - 0.0 /100 WBCs    RBC 3.80 (L) 4.00 - 5.20 x10*6/uL    Hemoglobin 11.1 (L) 12.0 - 16.0 g/dL    Hematocrit 33.1 (L) 36.0 - 46.0 %    MCV 87 80 - 100 fL    MCH 29.2 26.0 - 34.0 pg    MCHC 33.5 32.0 - 36.0 g/dL    RDW 16.3 (H) 11.5 - 14.5 %    Platelets 132 (L) 150 - 450 x10*3/uL   AFP Tumor Marker   Result Value Ref Range    Alpha-Fetoprotein 32 (H) 0 - 9 ng/mL   Hepatitis panel, acute   Result Value Ref  Range    Hepatitis B Surface AG Nonreactive Nonreactive    Hepatitis A  AB- IgM Nonreactive Nonreactive    Hepatitis B Core AB; IgM Nonreactive Nonreactive    Hepatitis C AB Nonreactive Nonreactive   Sterile Fluid Culture/Smear    Specimen: Ascites Fluid   Result Value Ref Range    Sterile Fluid Culture/Smear No growth to date     Gram Stain No polymorphonuclear leukocytes seen     Gram Stain No organisms seen    Albumin, Fluid   Result Value Ref Range    Albumin, Fluid 0.6 Not established g/dL   Body Fluid Cell Count   Result Value Ref Range    Color, Fluid Yellow Colorless, Straw, Yellow    Clarity, Fluid Clear Clear    WBC, Fluid 311 See Comment /uL    RBC, Fluid 2,000 0  /uL /uL   Body Fluid Differential   Result Value Ref Range    Neutrophils %, Manual, Fluid 6 <25 % %    Lymphocytes %, Manual, Fluid 42 <75 % %    Mono/Macrophages %, Manual, Fluid 52 <70 % %    Eosinophils %, Manual, Fluid 0 0 % %    Basophils %, Manual, Fluid 0 0 % %    Immature Granulocytes %, Manual, Fluid 0 0 % %    Blasts %, Manual, Fluid 0 0 % %    Unclassified Cells %, Manual, Fluid 0 0 % %    Plasma Cells %, Manual, Fluid 0 0 % %    Total Cells Counted, Fluid 100    Protein, Total Fluid   Result Value Ref Range    Protein, Total Fluid 1.0 Not established g/dL   Comprehensive metabolic panel   Result Value Ref Range    Glucose 107 (H) 65 - 99 mg/dL    Sodium 136 133 - 145 mmol/L    Potassium 3.6 3.4 - 5.1 mmol/L    Chloride 102 97 - 107 mmol/L    Bicarbonate 18 (L) 24 - 31 mmol/L    Urea Nitrogen 53 (H) 8 - 25 mg/dL    Creatinine 2.00 (H) 0.40 - 1.60 mg/dL    eGFR 25 (L) >60 mL/min/1.73m*2    Calcium 9.4 8.5 - 10.4 mg/dL    Albumin 3.3 (L) 3.5 - 5.0 g/dL    Alkaline Phosphatase 252 (H) 35 - 125 U/L    Total Protein 6.4 5.9 - 7.9 g/dL    AST 81 (H) 5 - 40 U/L    Bilirubin, Total 1.7 (H) 0.1 - 1.2 mg/dL    ALT 32 5 - 40 U/L    Anion Gap 16 <=19 mmol/L   Magnesium   Result Value Ref Range    Magnesium 2.10 1.60 - 3.10 mg/dL   Protime-INR    Result Value Ref Range    Protime 12.0 9.3 - 12.7 seconds    INR 1.1 0.9 - 1.2   CBC   Result Value Ref Range    WBC 8.1 4.4 - 11.3 x10*3/uL    nRBC 0.0 0.0 - 0.0 /100 WBCs    RBC 3.73 (L) 4.00 - 5.20 x10*6/uL    Hemoglobin 10.9 (L) 12.0 - 16.0 g/dL    Hematocrit 34.1 (L) 36.0 - 46.0 %    MCV 91 80 - 100 fL    MCH 29.2 26.0 - 34.0 pg    MCHC 32.0 32.0 - 36.0 g/dL    RDW 16.7 (H) 11.5 - 14.5 %    Platelets 142 (L) 150 - 450 x10*3/uL   Lavender Top   Result Value Ref Range    Extra Tube Hold for add-ons.    Protime-INR   Result Value Ref Range    Protime 12.0 9.3 - 12.7 seconds    INR 1.1 0.9 - 1.2   Comprehensive metabolic panel   Result Value Ref Range    Glucose 117 (H) 65 - 99 mg/dL    Sodium 137 133 - 145 mmol/L    Potassium 3.6 3.4 - 5.1 mmol/L    Chloride 104 97 - 107 mmol/L    Bicarbonate 20 (L) 24 - 31 mmol/L    Urea Nitrogen 51 (H) 8 - 25 mg/dL    Creatinine 1.60 0.40 - 1.60 mg/dL    eGFR 33 (L) >60 mL/min/1.73m*2    Calcium 9.4 8.5 - 10.4 mg/dL    Albumin 3.2 (L) 3.5 - 5.0 g/dL    Alkaline Phosphatase 249 (H) 35 - 125 U/L    Total Protein 6.1 5.9 - 7.9 g/dL    AST 83 (H) 5 - 40 U/L    Bilirubin, Total 1.6 (H) 0.1 - 1.2 mg/dL    ALT 32 5 - 40 U/L    Anion Gap 13 <=19 mmol/L         SIGNATURE: Arnulfo Linda MD PATIENT NAME: Kassandra Warner   DATE: December 15, 2023 MRN: [id number]   TIME: 10:39 AM PAGER: [phone]

## 2023-12-15 NOTE — PROGRESS NOTES
Kassandra Warner is a 78 y.o. female on day 2 of admission presenting with Acute kidney injury (CMS/HCC).      Subjective   Overall, feels better.  Complains of severe pain in the left knee which is chronic.  She has audible click noise when she moves.  Otherwise, denies fever chills, nausea, diarrhea.  Tolerates food.       Objective     Last Recorded Vitals  /57 (BP Location: Left arm, Patient Position: Lying)   Pulse 64   Temp 36.8 °C (98.2 °F) (Oral)   Resp 18   Wt 63.5 kg (140 lb)   SpO2 97%   Intake/Output last 3 Shifts:    Intake/Output Summary (Last 24 hours) at 12/15/2023 1048  Last data filed at 12/15/2023 0900  Gross per 24 hour   Intake 180 ml   Output 0 ml   Net 180 ml       Admission Weight  Weight: 63.5 kg (140 lb) (12/12/23 2000)    Daily Weight  12/12/23 : 63.5 kg (140 lb)    Image Results  ECG 12 lead  Sinus rhythm with Premature atrial complexes  Left axis deviation  Low voltage QRS  Inferior infarct , age undetermined  Possible Anterolateral infarct , age undetermined  Prolonged QT  Abnormal ECG  No previous ECGs available  Confirmed by Jaden Mchugh ([id number]) on 12/14/2023 12:31:45 PM      Physical Exam  Patient is sitting up in chair, eating breakfast.  She is more active.  Looks more comfortable.  Cooperative with exam.  Alert oriented.  Lungs are clear, diminished bilaterally.  Heart: Regular S1-S2.  Abdomen is increased with ascites, mildly tender diffusely.  No rebound.  Bowel sounds positive.  Extremities: Significantly improved area of erythema on the distal left leg.  Left knee is very tender to palpation, slightly warm but no erythema.  Relevant Results               Assessment/Plan                  Principal Problem:    Acute kidney injury (CMS/HCC)  Active Problems:    Hypokalemia    Lower extremity edema    Transaminasemia    Ascites    Acute renal failure.  Improving, down to patient's baseline which is between 1.2 and 1.6 creatinine.  Discussed with Dr. Linda: Okay to  start diuresis.  Will restart Lasix.  Liver cirrhosis due to alcohol intake in the past.  Ascites.  Discussed results of paracentesis with gastroenterology: This patient have SBP?  Does she need IV antibiotics.  I feel, she will need antibiotics for SBP prophylaxis as outpatient.  If gastroenterology does not feel that she has an active SBP will change antibiotics from IV to oral.  Left leg cellulitis, significantly improved with IV antibiotics  Left knee osteoarthritis, pain, status post fall.  X-ray did not demonstrate acute traumatic changes.  I will consult orthopedic surgery.  May be they can perform knee injection to alleviate patient's symptoms?.  Will use lidocaine patch topically.  Discharge planning.  Patient is close to be ready to be discharged to an acute rehab.  Hopefully we will have bed available tomorrow.  7.  Moderate malnutrition    Malnutrition Diagnosis Status: New  Malnutrition Diagnosis: Moderate malnutrition related to chronic disease or condition  As Evidenced by: Prolonged poor intake < 75% estimated energy needs for 4 months, Mild fat and muscle deficits, fluid accumulation may be masking weight loss.  I agree with the dietitian's malnutrition diagnosis.     Total time spent with patient 33 minutes.    Meghan William MD

## 2023-12-15 NOTE — PROGRESS NOTES
Occupational Therapy    Evaluation    Patient Name: Kassandra Warner  MRN: [id number]  Today's Date: 12/15/2023  Time Calculation  Start Time: 0725  Stop Time: 0742  Time Calculation (min): 17 min    Assessment  IP OT Assessment  OT Assessment: Patient is a 78 year old female who is admitted with LYUDMILA. Patient is presenting with a decline in strength, balance, activity tolerance, and function, resulting in an increased need for assistance with daily tasks. Skilled OT to address the above deficits and increase patient's safety and independence with daily tasks.  Prognosis: Good  End of Session Communication: Bedside nurse  End of Session Patient Position: Bed, 3 rail up  Plan:  Treatment Interventions: ADL retraining, Functional transfer training, UE strengthening/ROM, Endurance training, Patient/family training, Compensatory technique education  OT Frequency:  (3 times per week)  OT Discharge Recommendations: Moderate intensity level of continued care  OT Recommended Transfer Status: Maximum assist  OT - OK to Discharge: Yes    Subjective   Current Problem:  1. Acute kidney injury (CMS/HCC)        2. Hypokalemia        3. Abdominal pain, unspecified abdominal location        4. Acute pain of left knee        5. Lower extremity edema        6. Left leg cellulitis        7. Transaminasemia          General:  General  Reason for Referral: decline in ADLs  Referred By: Dr. Trevizo  Past Medical History Relevant to Rehab: Patient is a 78 year old female admitted with LYUDMILA. She had an u/s guided paracentesis. PMH: osteoporosis; cirrhosis; OA; CKD; GERD; COPD; L THR; neuropathy; sleep apnea; HTN; RA; hypothyroid; lumbar spondylosis; diverticulosis; hiatal hernia; cellulitis  Prior to Session Communication: Bedside nurse  Patient Position Received: Bed, 3 rail up  Preferred Learning Style: verbal  General Comment: Patient is cleared by nursing for therapy. Patient in bed upon arrival and agreeable to  participate  Precautions:  Medical Precautions: Fall precautions  Pain:  Pain Assessment  Pain Assessment: 0-10  Pain Score: 3  Pain Type: Chronic pain  Pain Location: Knee  Pain Orientation: Left  Pain Interventions: Ambulation/increased activity, Repositioned    Objective   Cognition:  Overall Cognitive Status: Impaired (patient is confused at times)  Orientation Level: Oriented X4  Insight: Mild  Processing Speed: Delayed     Home Living:  Type of Home: House  Lives With: Alone  Home Adaptive Equipment: Cane  Home Layout: One level, Laundry in basement, Stairs to alternate level with rails  Alternate Level Stairs-Rails: Right  Alternate Level Stairs-Number of Steps: full flight (laundry)  Home Access: Stairs to enter with rails  Entrance Stairs-Rails: Right  Entrance Stairs-Number of Steps: 2-3  Bathroom Shower/Tub: Tub/shower unit  Bathroom Toilet: Standard  Bathroom Equipment: Grab bars in shower, Shower chair with back   Prior Function:  Level of [place]: Independent with ADLs and functional transfers, Independent with homemaking with ambulation  Receives Help From: Family (son and DIL assist as needed)  ADL Assistance: Independent  Homemaking Assistance: Independent  Ambulatory Assistance: Independent (cane)  IADL History:  Homemaking Responsibilities: Yes  ADL:  Eating Assistance: Stand by  Eating Deficit: Setup (seated with items within reach)  Grooming Assistance: Minimal  Grooming Deficit: Setup, Steadying, Verbal cueing, Supervision/safety, Increased time to complete (per clinical judgement)  Bathing Assistance: Maximal  Bathing Deficit: Buttocks, Right lower leg including foot, Left lower leg including foot, Perineal area, Increased time to complete , Supervision/safety, Verbal cueing, Steadying, Setup (per clinical judgement)  UE Dressing Assistance: Moderate  UE Dressing Deficit: Pull over head, Thread RUE, Thread LUE, Verbal cueing, Supervision/safety, Increased time to complete, Setup (per  clinical judgement)  LE Dressing Assistance: Total  LE Dressing Deficit: Don/doff R sock, Don/doff L sock  Toileting Assistance with Device: Total  Toileting Deficit:  (purewick)  Activity Tolerance:  Endurance: Decreased tolerance for upright activites  Bed Mobility/Transfers: Bed Mobility  Bed Mobility: Yes  Bed Mobility 1  Bed Mobility 1: Supine to sitting  Level of Assistance 1: Maximum assistance  Bed Mobility Comments 1: assistance to move B LE and raise trunk  Bed Mobility 2  Bed Mobility  2: Sitting to supine  Level of Assistance 2: Maximum assistance  Bed Mobility Comments 2: assistance to raise B LE and support trunk    Transfers  Transfer: Yes  Transfer 1  Transfer From 1: Bed to  Transfer to 1: Stand  Technique 1: Sit to stand  Transfer Device 1: Walker  Transfer Level of Assistance 1: Maximum assistance  Trials/Comments 1: x2 trials. poor standing balance. retropulsive in stance. cues for safety    IADL's:   Homemaking Responsibilities: Yes  Vision: Vision - Basic Assessment  Current Vision: Wears glasses all the time  Sensation:  Sensation Comment: patient denies numbness/tingling  Strength:  Strength Comments: B UE 3+/5 distally  Extremities: RUE   RUE : Exceptions to WFL (decreased shoulder ROM) and LUE   LUE: Exceptions to WFL (decreased shoulder ROM)    Outcome Measures: [facility] Daily Activity  Putting on and taking off regular lower body clothing: Total  Bathing (including washing, rinsing, drying): A lot  Putting on and taking off regular upper body clothing: A lot  Toileting, which includes using toilet, bedpan or urinal: Total  Taking care of personal grooming such as brushing teeth: A little  Eating Meals: A little  Daily Activity - Total Score: 12    Education Documentation  Body Mechanics, taught by Felicia Wang OT at 12/15/2023  8:31 AM.  Learner: Patient  Readiness: Acceptance  Method: Explanation, Demonstration  Response: Needs Reinforcement    Precautions, taught by Felicia Wang OT  at 12/15/2023  8:31 AM.  Learner: Patient  Readiness: Acceptance  Method: Explanation, Demonstration  Response: Needs Reinforcement    Education Comments  No comments found.      Goals:   Encounter Problems       Encounter Problems (Active)       OT Goals       ADLs (Progressing)       Start:  12/15/23    Expected End:  01/12/24       Patient will complete ADL tasks with Mod I, using AE as needed, in order to increase patient's safety and independence with self-care tasks.         Functional Transfers (Progressing)       Start:  12/15/23    Expected End:  01/12/24       Patient will complete functional transfers with Mod I in order to increase patient's safety and independence with daily tasks.         B UE Strengthening (Progressing)       Start:  12/15/23    Expected End:  01/12/24       Patient will increase B UE strength to 4+/5 for functional transfers.         Standing Balance (Progressing)       Start:  12/15/23    Expected End:  01/12/24       Patient will demonstrate the ability to stand with Good balance in order to increase safety during functional transfers.

## 2023-12-15 NOTE — PROGRESS NOTES
Physical Therapy    Physical Therapy Treatment    Patient Name: Kassandra Warner  MRN: [id number]  Today's Date: 12/15/2023  Time Calculation  Start Time: 0806  Stop Time: 0832  Time Calculation (min): 26 min       Assessment/Plan   PT Assessment Results: Decreased strength, Decreased range of motion, Decreased endurance, Impaired balance, Decreased mobility, Decreased coordination, Impaired judgement  Rehab Prognosis: Good  Evaluation/Treatment Tolerance: Patient limited by fatigue  Medical Staff Made Aware: Yes  End of Session Communication: Bedside nurse  Assessment Comment: pt slowly progressing; pt needed encouragement to participate; pt appeared guarded during transfers  End of Session Patient Position: Up in chair (call button in reach)    PT Plan  Inpatient/Swing Bed or Outpatient: Inpatient  PT Plan  Treatment/Interventions: Bed mobility, Transfer training, Gait training, Balance training, Stair training, Strengthening, Endurance training  PT Plan: Skilled PT  PT Frequency: 4 times per week  PT Discharge Recommendations: Moderate intensity level of continued care  Equipment Recommended upon Discharge: Wheeled walker  PT Recommended Transfer Status:  (MOD A)  PT - OK to Discharge: Yes      General Visit Information:   PT  Visit  PT Received On: 12/15/23  General  Reason for Referral: LYUDMILA; impaired mobility  Past Medical History Relevant to Rehab: osteoporosis; cirrhosis; OA; CKD; GERD; COPD; L THR; neuropathy; sleep apnea; HTN; RA; hypothyroid; lumbar spondylosis; diverticulosis; hiatal hernia; cellulitis  Prior to Session Communication: Bedside nurse  Patient Position Received: Bed, 2 rail up  General Comment: pt awake and willing to get OOB    Subjective     Precautions:  Hearing/Visual Limitations: wears glasses  Medical Precautions: Fall precautions     Objective   Pain:  Pain Assessment:  2/10 abdomen; L knee and hip    Cognition:  Overall Cognitive Status: Within Functional Limits  Orientation Level:  Oriented X4    Postural Control:  Postural Control  Posture Comment: retrolean in sitting EOB      Activity Tolerance:  Activity Tolerance Comments: pt easily fatigued with activities    Treatments:  Therapeutic Exercise Performed:  (B LE AAROM/AROM therex: AP, GS, QS, HS, ABD, SAQ x 10-15 reps; VC to stay on task)      Bed Mobility:  (supine to sit with MOD A for trunk up and B LE; MAX A for scooting; pt appeared to guard L knee from bending; good- sitting balance on EOB)    Ambulation/Gait Training Performed:  pt took 7-8 short B step length with walker and MOD A to steady. mild sway observed    Transfer:  sit to stand with MOD A. pt pulled up on braced walker. fair+ standing balance; stand to sit with MIN A. VC for safe hand placement; fair eccentric control         Outcome Measures:  [facility] Basic Mobility  Turning from your back to your side while in a flat bed without using bedrails: A lot  Moving from lying on your back to sitting on the side of a flat bed without using bedrails: A lot  Moving to and from bed to chair (including a wheelchair): A lot  Standing up from a chair using your arms (e.g. wheelchair or bedside chair): A lot  To walk in hospital room: A lot  Climbing 3-5 steps with railing: A lot  Basic Mobility - Total Score: 12      OP EDUCATION:  Outpatient Education  Education Comment: safety techniques during mobilty    Encounter Problems       Encounter Problems (Active)       Mobility       STG - Patient will ambulate 40' x 1 using rolling walker with SUPERVISION (Progressing)       Start:  12/14/23    Expected End:  12/16/23            STG - Patient will negotiate 3 stairs using 1 railing CGA (Progressing)       Start:  12/14/23    Expected End:  12/16/23               Pain - Adult          Transfers       STG - Patient to transfer to and from sit to supine CGA (Progressing)       Start:  12/14/23    Expected End:  12/16/23            STG - Patient will transfer sit to and from stand CGA  (Progressing)       Start:  12/14/23    Expected End:  12/16/23

## 2023-12-16 ENCOUNTER — HOME HEALTH ADMISSION (OUTPATIENT)
Dept: HOME HEALTH SERVICES | Facility: HOME HEALTH | Age: 78
End: 2023-12-16
Payer: MEDICARE

## 2023-12-16 VITALS
SYSTOLIC BLOOD PRESSURE: 126 MMHG | TEMPERATURE: 97.9 F | HEART RATE: 92 BPM | RESPIRATION RATE: 16 BRPM | DIASTOLIC BLOOD PRESSURE: 56 MMHG | OXYGEN SATURATION: 92 % | WEIGHT: 140 LBS | BODY MASS INDEX: 27.48 KG/M2 | HEIGHT: 60 IN

## 2023-12-16 PROBLEM — N17.9 ACUTE KIDNEY INJURY (CMS-HCC): Status: RESOLVED | Noted: 2023-12-12 | Resolved: 2023-12-16

## 2023-12-16 PROBLEM — R74.01 TRANSAMINASEMIA: Status: RESOLVED | Noted: 2023-12-12 | Resolved: 2023-12-16

## 2023-12-16 PROBLEM — R60.0 LOWER EXTREMITY EDEMA: Status: RESOLVED | Noted: 2023-12-12 | Resolved: 2023-12-16

## 2023-12-16 PROBLEM — E87.6 HYPOKALEMIA: Status: RESOLVED | Noted: 2023-12-12 | Resolved: 2023-12-16

## 2023-12-16 LAB
ALBUMIN SERPL-MCNC: 3.4 G/DL (ref 3.5–5)
ALP BLD-CCNC: 292 U/L (ref 35–125)
ALT SERPL-CCNC: 36 U/L (ref 5–40)
ANION GAP SERPL CALC-SCNC: 14 MMOL/L
AST SERPL-CCNC: 97 U/L (ref 5–40)
BACTERIA FLD CULT: NORMAL
BILIRUB SERPL-MCNC: 1.5 MG/DL (ref 0.1–1.2)
BUN SERPL-MCNC: 51 MG/DL (ref 8–25)
CALCIUM SERPL-MCNC: 9.3 MG/DL (ref 8.5–10.4)
CHLORIDE SERPL-SCNC: 101 MMOL/L (ref 97–107)
CO2 SERPL-SCNC: 19 MMOL/L (ref 24–31)
CREAT SERPL-MCNC: 1.6 MG/DL (ref 0.4–1.6)
GFR SERPL CREATININE-BSD FRML MDRD: 33 ML/MIN/1.73M*2
GLUCOSE SERPL-MCNC: 116 MG/DL (ref 65–99)
GRAM STN SPEC: NORMAL
GRAM STN SPEC: NORMAL
INR PPP: 1.1 (ref 0.9–1.2)
POTASSIUM SERPL-SCNC: 4 MMOL/L (ref 3.4–5.1)
PROT SERPL-MCNC: 6.6 G/DL (ref 5.9–7.9)
PROTHROMBIN TIME: 11.9 SECONDS (ref 9.3–12.7)
SODIUM SERPL-SCNC: 134 MMOL/L (ref 133–145)

## 2023-12-16 PROCEDURE — 2500000004 HC RX 250 GENERAL PHARMACY W/ HCPCS (ALT 636 FOR OP/ED): Performed by: NURSE PRACTITIONER

## 2023-12-16 PROCEDURE — 82947 ASSAY GLUCOSE BLOOD QUANT: CPT | Performed by: INTERNAL MEDICINE

## 2023-12-16 PROCEDURE — 36415 COLL VENOUS BLD VENIPUNCTURE: CPT | Performed by: INTERNAL MEDICINE

## 2023-12-16 PROCEDURE — 2500000001 HC RX 250 WO HCPCS SELF ADMINISTERED DRUGS (ALT 637 FOR MEDICARE OP): Performed by: INTERNAL MEDICINE

## 2023-12-16 PROCEDURE — 2500000004 HC RX 250 GENERAL PHARMACY W/ HCPCS (ALT 636 FOR OP/ED): Performed by: INTERNAL MEDICINE

## 2023-12-16 PROCEDURE — 2500000002 HC RX 250 W HCPCS SELF ADMINISTERED DRUGS (ALT 637 FOR MEDICARE OP, ALT 636 FOR OP/ED): Performed by: INTERNAL MEDICINE

## 2023-12-16 PROCEDURE — 2500000005 HC RX 250 GENERAL PHARMACY W/O HCPCS: Performed by: INTERNAL MEDICINE

## 2023-12-16 PROCEDURE — 85610 PROTHROMBIN TIME: CPT | Performed by: INTERNAL MEDICINE

## 2023-12-16 RX ORDER — CIPROFLOXACIN 250 MG/1
250 TABLET, FILM COATED ORAL DAILY
Qty: 30 TABLET | Refills: 0 | Status: SHIPPED | OUTPATIENT
Start: 2023-12-16

## 2023-12-16 RX ORDER — ACETAMINOPHEN 325 MG/1
650 TABLET ORAL EVERY 4 HOURS PRN
Qty: 30 TABLET | Refills: 0
Start: 2023-12-16

## 2023-12-16 RX ORDER — LIDOCAINE 560 MG/1
1 PATCH PERCUTANEOUS; TOPICAL; TRANSDERMAL DAILY
Qty: 30 PATCH | Refills: 0 | Status: SHIPPED | OUTPATIENT
Start: 2023-12-17 | End: 2024-01-16

## 2023-12-16 RX ORDER — ACETAMINOPHEN 500 MG
5 TABLET ORAL NIGHTLY PRN
Refills: 0
Start: 2023-12-16

## 2023-12-16 RX ORDER — CIPROFLOXACIN 250 MG/1
250 TABLET, FILM COATED ORAL EVERY 12 HOURS SCHEDULED
Qty: 6 TABLET | Refills: 0 | Status: SHIPPED | OUTPATIENT
Start: 2023-12-16

## 2023-12-16 RX ORDER — CIPROFLOXACIN 250 MG/1
250 TABLET, FILM COATED ORAL EVERY 12 HOURS SCHEDULED
Status: DISCONTINUED | OUTPATIENT
Start: 2023-12-16 | End: 2023-12-16 | Stop reason: HOSPADM

## 2023-12-16 RX ORDER — CIPROFLOXACIN 500 MG/1
500 TABLET ORAL ONCE
Status: SHIPPED | OUTPATIENT
Start: 2023-12-16

## 2023-12-16 RX ADMIN — Medication 2000 UNITS: at 10:41

## 2023-12-16 RX ADMIN — CEFEPIME 1 G: 1 INJECTION, POWDER, FOR SOLUTION INTRAMUSCULAR; INTRAVENOUS at 04:00

## 2023-12-16 RX ADMIN — LETROZOLE 2.5 MG: 2.5 TABLET ORAL at 10:41

## 2023-12-16 RX ADMIN — PANTOPRAZOLE SODIUM 40 MG: 40 TABLET, DELAYED RELEASE ORAL at 06:00

## 2023-12-16 RX ADMIN — SULFAMETHOXAZOLE AND TRIMETHOPRIM 160 MG: 800; 160 TABLET ORAL at 10:42

## 2023-12-16 RX ADMIN — CIPROFLOXACIN HYDROCHLORIDE 250 MG: 250 TABLET, FILM COATED ORAL at 16:45

## 2023-12-16 RX ADMIN — OXYBUTYNIN CHLORIDE 5 MG: 5 TABLET ORAL at 10:42

## 2023-12-16 RX ADMIN — PANCRELIPASE 1 CAPSULE: 24000; 76000; 120000 CAPSULE, DELAYED RELEASE PELLETS ORAL at 10:42

## 2023-12-16 RX ADMIN — DOCUSATE SODIUM 100 MG: 100 CAPSULE, LIQUID FILLED ORAL at 10:42

## 2023-12-16 RX ADMIN — POLYETHYLENE GLYCOL 3350 17 G: 17 POWDER, FOR SOLUTION ORAL at 10:40

## 2023-12-16 RX ADMIN — FUROSEMIDE 40 MG: 40 TABLET ORAL at 10:42

## 2023-12-16 RX ADMIN — CYANOCOBALAMIN TAB 1000 MCG 1000 MCG: 1000 TAB at 10:42

## 2023-12-16 ASSESSMENT — COGNITIVE AND FUNCTIONAL STATUS - GENERAL
TOILETING: TOTAL
DRESSING REGULAR LOWER BODY CLOTHING: TOTAL
HELP NEEDED FOR BATHING: A LOT
DRESSING REGULAR UPPER BODY CLOTHING: A LOT
PERSONAL GROOMING: TOTAL
DAILY ACTIVITIY SCORE: 11

## 2023-12-16 ASSESSMENT — PAIN SCALES - GENERAL
PAINLEVEL_OUTOF10: 0 - NO PAIN

## 2023-12-16 ASSESSMENT — PAIN - FUNCTIONAL ASSESSMENT
PAIN_FUNCTIONAL_ASSESSMENT: 0-10
PAIN_FUNCTIONAL_ASSESSMENT: 0-10

## 2023-12-16 ASSESSMENT — PAIN SCALES - WONG BAKER: WONGBAKER_NUMERICALRESPONSE: NO HURT

## 2023-12-16 NOTE — PROGRESS NOTES
Patient is scheduled for discharge to home with [facility]. Referral was made to [facility], accepted and will see patient on Tuesday.     Lizette Keith RN

## 2023-12-16 NOTE — DISCHARGE SUMMARY
Discharge Diagnosis  Acute kidney injury (CMS/HCC)    Issues Requiring Follow-Up  Follow-up with nephrologist and gastroenterologist    Discharge Meds     Your medication list        START taking these medications        Instructions Last Dose Given Next Dose Due   acetaminophen 325 mg tablet  Commonly known as: Tylenol      Take 2 tablets (650 mg) by mouth every 4 hours if needed for mild pain (1 - 3).       ciprofloxacin 250 mg tablet  Commonly known as: Cipro      Take 1 tablet (250 mg) by mouth every 12 hours.       ciprofloxacin 250 mg tablet  Commonly known as: Cipro      Take 1 tablet (250 mg) by mouth once daily.       lidocaine 4 % patch  Start taking on: December 17, 2023      Place 1 patch over 12 hours on the skin once daily. Remove & discard patch within 12 hours or as directed by MD. Do not start before December 17, 2023.       melatonin 5 mg tablet      Take 1 tablet (5 mg) by mouth as needed at bedtime for sleep.              CONTINUE taking these medications        Instructions Last Dose Given Next Dose Due   cholecalciferol 50 MCG (2000 UT) tablet  Commonly known as: Vitamin D-3           Creon 24,000-76,000 -120,000 unit capsule  Generic drug: lipase-protease-amylase           docusate sodium 100 mg capsule  Commonly known as: Colace           DULoxetine 30 mg DR capsule  Commonly known as: Cymbalta           furosemide 40 mg tablet  Commonly known as: Lasix           gabapentin 300 mg capsule  Commonly known as: Neurontin      1 capsule Orally once at bedtime       letrozole 2.5 mg tablet  Commonly known as: Femara           Myrbetriq 25 mg tablet extended release 24 hr 24 hr tablet  Generic drug: mirabegron           pantoprazole 40 mg EC tablet  Commonly known as: ProtoNix           polyethylene glycol 17 gram packet  Commonly known as: Glycolax, Miralax           Vitamin B-12 1,000 mcg tablet  Generic drug: cyanocobalamin                  STOP taking these medications       losartan-hydrochlorothiazide 100-25 mg tablet  Commonly known as: Hyzaar        spironolactone 50 mg tablet  Commonly known as: Aldactone                  Where to Get Your Medications        These medications were sent to [id number] - [place], [place] - [address]  [address] [address] [address]      Phone: [phone]   ciprofloxacin 250 mg tablet  ciprofloxacin 250 mg tablet  lidocaine 4 % patch       Information about where to get these medications is not yet available    Ask your nurse or doctor about these medications  acetaminophen 325 mg tablet  melatonin 5 mg tablet         Test Results Pending At Discharge  Pending Labs       Order Current Status    Serial Troponin, 2 Hour ([facility]) Collected (12/12/23 2216)    Non-gynecologic cytology In process    Troponin T Series, High Sensitivity (0, 2HR, 6HR) In process            Hospital Course   History:Kassandra Warner is a 78 y.o. female presenting with history of falls, pain in the left knee.    Reports 2 falls in the last few weeks.  She had a fall 4 to 5 weeks ago when she apparently tripped.  Her son was with her and unsuccessfully attempted to prevent her from falling.  She had another fall about 2 weeks later while in the kitchen of her home.  She did not lose consciousness.  She was unable to get up and called her son who came to help her.  She says she came to the emergency room because of pain in the left knee resulting from the falls.  During workup, she was found to have an elevated serum creatinine of 2.4, up from 1.2 in 9/2023.  A CT scan of the abdomen pelvis showed ascites which appears to be new and cirrhosis of the liver.  The patient has a history of alcohol use and stopped drinking in 2020 per her report.  Upon further review of medical records, she was reported to have cirrhosis of the liver when admitted to the hospital in August 2020.  An EGD on 8/4/2020 showed grade 1 esophageal varices.  The patient reports that  he had been sleeping excessively.  She lost her  4 months ago and thinks that she is depressed.  She is admitted for further evaluation of acute kidney failure and ascites  Diagnostic work-up:  CAT scan of the abdomen pelvis:1. Cirrhosis with ascites and anasarca now seen compared to the prior   exam.   2. Colonic diverticulosis without diverticulitis. No obstruction or   ileus. No enteritis or colitis. No stercoral ulcer with much less   stool throughout the large bowel compared to the prior study. Much   less stool in the rectum compared to the prior study.   3. Small hiatal hernia.   4. No obstructive uropathy.   5. No choledocholithiasis or ductal dilatation.   6. No adenopathy throughout the examination.      CT of the brain:1. No acute intracranial findings.   2. Symmetric volume loss of the cerebral hemispheres.   3. Periventricular Microangiopathy.   4. No posttraumatic abnormality.     Chest x-ray:No focal airspace disease with coarse vascular markings and very mild   central vascular congestion with left ventricular enlargement   unchanged from previous. No focal airspace disease.     Left knee x-ray:Osteoarthritis is severe in the patellofemoral joint more mild in the   mediolateral compartments with chondrocalcinosis noted. Extensive   subcutaneous edema is seen surrounding the knee. This is suggestive   of fluid overload though an injury may have this appearance.     Patient had diagnostic paracentesis, fluid demonstrated 300 of nucleated cells but neutrophil count was only 6%.  Patient had some evidence of acute renal failure with initial creatinine 2.4.  We held Aldactone, Lasix.  Patient was evaluated by nephrologist, Dr. Chan marino.  Renal function improved and creatinine is 1.6 now which is close to her baseline.  Patient was evaluated by gastroenterology.  The impression was that patient did not have spontaneous bacterial peritonitis but she needs prophylaxis against spontaneous bacterial  peritonitis.  Renal recommended daily Bactrim but due to her renal impairment we will discharge patient on ciprofloxacin.  Patient had evidence of cellulitis of lower extremities, was treated with IV antibiotics.  Significantly improved.  Patient will be discharged on 3 more days of oral ciprofloxacin 250 mg twice a day for cellulitis and then she is supposed to take 250 mg daily of ciprofloxacin indefinitely.  We discussed discharge options.  Patient does not want to go to rehab.  She opted to go home with home health.  Patient will be discharged home today with home health.  Total time spent with patient today 40 minutes including exam, discussion, paperwork.    Pertinent Physical Exam At Time of Discharge  Physical Exam  Patient is sitting up in chair, eating breakfast.  She is more active.  Looks more comfortable.  Cooperative with exam.  Alert oriented.  Lungs are clear, diminished bilaterally.  Heart: Regular S1-S2.  Abdomen is increased with ascites, mildly tender diffusely.  No rebound.  Bowel sounds positive.  Extremities: Significantly improved area of erythema on the distal left leg.  Left knee is very tender to palpation, slightly warm but no erythema.  Outpatient Follow-Up  Future Appointments   Date Time Provider Department Center   1/24/2024  1:20 PM Walter Lai MD [id number]   4/8/2024  1:00 PM Evie Rao MD [id number] [facility]         Meghan William MD

## 2023-12-16 NOTE — NURSING NOTE
Assumed care of pt at this time. pt in bed resting with son at bedside. Both pt and son awaiting response from dr with regard to test results as well as discharge infomation

## 2023-12-17 NOTE — NURSING NOTE
Pt discharged to home with son. Taken to front entrance via wheelchair. IV removed. All paperwork sent with patient. All questions and concerns answered. Home health care to see pt on tuesday

## 2023-12-18 LAB
LABORATORY COMMENT REPORT: NORMAL
LABORATORY COMMENT REPORT: NORMAL
PATH REPORT.FINAL DX SPEC: NORMAL
PATH REPORT.GROSS SPEC: NORMAL
PATH REPORT.RELEVANT HX SPEC: NORMAL
PATH REPORT.TOTAL CANCER: NORMAL

## 2023-12-19 ENCOUNTER — HOME CARE VISIT (OUTPATIENT)
Dept: HOME HEALTH SERVICES | Facility: HOME HEALTH | Age: 78
End: 2023-12-19
Payer: MEDICARE

## 2023-12-19 ENCOUNTER — TELEPHONE (OUTPATIENT)
Dept: PRIMARY CARE | Facility: CLINIC | Age: 78
End: 2023-12-19

## 2023-12-19 VITALS
RESPIRATION RATE: 18 BRPM | TEMPERATURE: 98.5 F | DIASTOLIC BLOOD PRESSURE: 64 MMHG | SYSTOLIC BLOOD PRESSURE: 120 MMHG | HEART RATE: 76 BPM | OXYGEN SATURATION: 92 %

## 2023-12-19 PROCEDURE — G0299 HHS/HOSPICE OF RN EA 15 MIN: HCPCS | Mod: HHH

## 2023-12-19 PROCEDURE — 0023 HH SOC

## 2023-12-19 PROCEDURE — 169592 NO-PAY CLAIM PROCEDURE

## 2023-12-19 PROCEDURE — 1090000002 HH PPS REVENUE DEBIT

## 2023-12-19 PROCEDURE — 1090000001 HH PPS REVENUE CREDIT

## 2023-12-19 ASSESSMENT — ENCOUNTER SYMPTOMS
DRY SKIN: 1
PAIN LOCATION: HEAD
DYSPNEA ACTIVITY LEVEL: AFTER AMBULATING LESS THAN 10 FT
SPUTUM PRODUCTION: 1
COUGH CHARACTERISTICS: PRODUCTIVE
HIGHEST PAIN SEVERITY IN PAST 24 HOURS: 6/10
PAIN SEVERITY GOAL: 0/10
SUBJECTIVE PAIN PROGRESSION: UNCHANGED
BOWEL PATTERN NORMAL: 1
MUSCLE WEAKNESS: 1
PAIN: 1
COUGH: 1
DEPRESSION: 0
FORGETFULNESS: 1
DESCRIPTION OF MEMORY LOSS: LONG TERM
LAST BOWEL MOVEMENT: 66827
FATIGUE: 1
CHANGE IN APPETITE: UNCHANGED
APPETITE LEVEL: FAIR
PAIN LOCATION - PAIN SEVERITY: 6/10
SHORTNESS OF BREATH: 1
OCCASIONAL FEELINGS OF UNSTEADINESS: 1
SPUTUM COLOR: WHITE
LOWEST PAIN SEVERITY IN PAST 24 HOURS: 6/10
LOSS OF SENSATION IN FEET: 0
STOOL FREQUENCY: LESS THAN DAILY
SPUTUM CONSISTENCY: THICK
DESCRIPTION OF MEMORY LOSS: SHORT TERM
DIZZINESS: 1
PAIN LOCATION - PAIN QUALITY: ACHE

## 2023-12-19 ASSESSMENT — PAIN SCALES - PAIN ASSESSMENT IN ADVANCED DEMENTIA (PAINAD)
NEGVOCALIZATION: 0 - NONE.
BODYLANGUAGE: 0
CONSOLABILITY: 0
NEGVOCALIZATION: 0
TOTALSCORE: 0
FACIALEXPRESSION: 0
FACIALEXPRESSION: 0 - SMILING OR INEXPRESSIVE.
CONSOLABILITY: 0 - NO NEED TO CONSOLE.
BREATHING: 0
BODYLANGUAGE: 0 - RELAXED.

## 2023-12-19 ASSESSMENT — ACTIVITIES OF DAILY LIVING (ADL)
ENTERING_EXITING_HOME: MODERATE ASSIST
AMBULATION ASSISTANCE: 1
OASIS_M1830: 05
AMBULATION ASSISTANCE: ONE PERSON

## 2023-12-19 NOTE — TELEPHONE ENCOUNTER
Colleen from  home care left voicemail stating pt fell last night at home and hit head. She stated in message that pt refuses to go to ER. Colleen did not leave a phone number to call back. She wanted Dr. Rao to be aware. I tried calling pt to check on her, no answer. Will try again

## 2023-12-20 ENCOUNTER — HOME CARE VISIT (OUTPATIENT)
Dept: HOME HEALTH SERVICES | Facility: HOME HEALTH | Age: 78
End: 2023-12-20
Payer: MEDICARE

## 2023-12-20 PROCEDURE — G0156 HHCP-SVS OF AIDE,EA 15 MIN: HCPCS | Mod: HHH

## 2023-12-20 PROCEDURE — 1090000002 HH PPS REVENUE DEBIT

## 2023-12-20 PROCEDURE — 1090000001 HH PPS REVENUE CREDIT

## 2023-12-21 ENCOUNTER — HOME CARE VISIT (OUTPATIENT)
Dept: HOME HEALTH SERVICES | Facility: HOME HEALTH | Age: 78
End: 2023-12-21
Payer: MEDICARE

## 2023-12-21 VITALS
OXYGEN SATURATION: 93 % | DIASTOLIC BLOOD PRESSURE: 60 MMHG | SYSTOLIC BLOOD PRESSURE: 116 MMHG | HEART RATE: 71 BPM | TEMPERATURE: 98.9 F

## 2023-12-21 VITALS
HEART RATE: 79 BPM | DIASTOLIC BLOOD PRESSURE: 60 MMHG | TEMPERATURE: 98.9 F | OXYGEN SATURATION: 93 % | SYSTOLIC BLOOD PRESSURE: 116 MMHG | RESPIRATION RATE: 18 BRPM

## 2023-12-21 VITALS
RESPIRATION RATE: 20 BRPM | BODY MASS INDEX: 28.61 KG/M2 | OXYGEN SATURATION: 92 % | HEART RATE: 70 BPM | WEIGHT: 146.5 LBS

## 2023-12-21 PROCEDURE — 1090000001 HH PPS REVENUE CREDIT

## 2023-12-21 PROCEDURE — 1090000003 HH PPS REVENUE ADJ

## 2023-12-21 PROCEDURE — G0299 HHS/HOSPICE OF RN EA 15 MIN: HCPCS | Mod: HHH

## 2023-12-21 PROCEDURE — 1090000002 HH PPS REVENUE DEBIT

## 2023-12-21 PROCEDURE — G0151 HHCP-SERV OF PT,EA 15 MIN: HCPCS | Mod: HHH

## 2023-12-21 PROCEDURE — G0152 HHCP-SERV OF OT,EA 15 MIN: HCPCS | Mod: HHH

## 2023-12-21 SDOH — HEALTH STABILITY: PHYSICAL HEALTH
EXERCISE COMMENTS: PT INSTRUCTED PATIENT IN SEATED EXERCISES 1X5:  - KNEE EXTENSION  - KNEE FLEXION  - HIP FLEXION  - HIP ABD/ADD  - ANKLE PF/DF

## 2023-12-21 SDOH — HEALTH STABILITY: PHYSICAL HEALTH: EXERCISE TYPE: PT INSTRUCTED PATIENT IN SEATED THER EX IN ORDER TO IMPROVE STRENGTH, CIRCULATION, NM CONTROL

## 2023-12-21 SDOH — ECONOMIC STABILITY: HOUSING INSECURITY
HOME SAFETY: PT IS HOME ALONE ALL DAY AND ALL NIGHT, HER SON BRINGS DINNER AFTER HE WORKS AT ABOUT 6PM. PT IS NOT SAFE TO BE HOME ALONE, IS HAVING DIFFICULTY WITH AMBULATION, UNABLE TO MANAGE MEDS, UNABLE TO GET FOOD AND BEV.

## 2023-12-21 ASSESSMENT — ACTIVITIES OF DAILY LIVING (ADL)
CURRENT_FUNCTION: CONTACT GUARD ASSIST
LAUNDRY ASSESSED: 1
FEEDING ASSESSED: 1
TOILETING: ONE PERSON
LIGHT HOUSEKEEPING: DEPENDENT
AMBULATION ASSISTANCE ON FLAT SURFACES: 1
TOILETING: MODERATE ASSIST
AMBULATION_DISTANCE/DURATION_TOLERATED: 20 FEET
CURRENT_FUNCTION: ONE PERSON
SHOPPING ASSESSED: 1
TRANSPORTATION ASSESSED: 1
USING THE TELPHONE: NEEDS ASSISTANCE
HOUSEKEEPING ASSESSED: 1
TOILETING: 1
LAUNDRY: DEPENDENT
AMBULATION ASSISTANCE: 1
GROOMING_CURRENT_FUNCTION: CONTACT GUARD ASSIST
BATHING_CURRENT_FUNCTION: MAXIMUM ASSIST
FEEDING: SUPERVISION
TRANSPORTATION: DEPENDENT
BATHING ASSESSED: 1
SHOPPING: DEPENDENT
PREPARING MEALS: DEPENDENT
AMBULATION ASSISTANCE: ONE PERSON
BATHING_CURRENT_FUNCTION: ONE PERSON
PHYSICAL TRANSFERS ASSESSED: 1
DRESSING_LB_CURRENT_FUNCTION: MAXIMUM ASSIST
TELEPHONE USE ASSESSED: 1
DRESSING_UB_CURRENT_FUNCTION: MODERATE ASSIST
GROOMING ASSESSED: 1

## 2023-12-21 ASSESSMENT — ENCOUNTER SYMPTOMS
PERSON REPORTING PAIN: PATIENT
ABDOMINAL PAIN: 1
PAIN: 1
PAIN LOCATION: LEFT KNEE
OCCASIONAL FEELINGS OF UNSTEADINESS: 1
PAIN LOCATION - PAIN QUALITY: HURTS
DEPRESSION: 1
SUBJECTIVE PAIN PROGRESSION: UNCHANGED
LIMITED RANGE OF MOTION: 1
PAIN LOCATION - PAIN SEVERITY: 3/10
PAIN LOCATION - PAIN QUALITY: HURTS
MUSCLE WEAKNESS: 1
PAIN: 1
PERSON REPORTING PAIN: PATIENT
LAST BOWEL MOVEMENT: 66829
DESCRIPTION OF MEMORY LOSS: SHORT TERM
PAIN LOCATION - PAIN DURATION: WITH ACTIVITY
LOWEST PAIN SEVERITY IN PAST 24 HOURS: 0/10
PAIN LOCATION - PAIN FREQUENCY: CONSTANT
DEPRESSED MOOD: 1
FLUID RETENTION: 1
HIGHEST PAIN SEVERITY IN PAST 24 HOURS: 7/10
LOWER EXTREMITY EDEMA: 1
APPETITE LEVEL: FAIR
PAIN LOCATION - PAIN SEVERITY: 5/10
PAIN LOCATION - RELIEVING FACTORS: RESTING
PAIN SEVERITY GOAL: 0/10
HOARSE VOICE: 1
CHANGE IN APPETITE: UNCHANGED
PAIN LOCATION - PAIN FREQUENCY: CONSTANT
PAIN LOCATION - EXACERBATING FACTORS: NOTHING
LOWEST PAIN SEVERITY IN PAST 24 HOURS: 3/10
STOOL FREQUENCY: DAILY
FORGETFULNESS: 1
PAIN LOCATION: ABDOMEN
BOWEL PATTERN NORMAL: 1
FATIGUE: 1
PAIN LOCATION - RELIEVING FACTORS: UNSURE
FATIGUES EASILY: 1
HIGHEST PAIN SEVERITY IN PAST 24 HOURS: 3/10
DESCRIPTION OF MEMORY LOSS: IMMEDIATE
LIMITED RANGE OF MOTION: 1
MUSCLE WEAKNESS: 1
PAIN LOCATION - PAIN DURATION: ALL THE TIME
PAIN LOCATION: ABDOMEN
PAIN LOCATION - PAIN SEVERITY: 3/10

## 2023-12-21 NOTE — Clinical Note
Thank you, Jumana!  ----- Message -----  From: Jumana Ortiz RN  Sent: 12/21/2023  10:15 PM EST  To: Johnson Richardson, OT; Luiza Price, PT; *      Sent pt to ER.  Family choosing [facility].    Goal pt to go to rehab. { she has fallen 3 times since home, not taken meds for two days, not eaten today, more wt gain and  ABD girth}

## 2023-12-21 NOTE — Clinical Note
Sent pt to ER.  Family choosing [facility].    Goal pt to go to rehab. { she has fallen 3 times since home, not taken meds for two days, not eaten today, more wt gain and  ABD girth}

## 2023-12-21 NOTE — Clinical Note
Pt agrees to rehab but definitely does not want [facility] nor does son.  Pt has had 3 falls in 5 days, missed two days of meds, and hadn't eaten breakfast or lunch today. Wt is increased and ABD girth.   Pt agrees to have son drive herto rehab or even to ER but no ambulance.

## 2023-12-22 PROCEDURE — 1090000002 HH PPS REVENUE DEBIT

## 2023-12-22 PROCEDURE — 1090000001 HH PPS REVENUE CREDIT

## 2023-12-23 PROCEDURE — 1090000002 HH PPS REVENUE DEBIT

## 2023-12-23 PROCEDURE — 1090000001 HH PPS REVENUE CREDIT

## 2023-12-23 SDOH — ECONOMIC STABILITY: HOUSING INSECURITY: HOME SAFETY: COGNITION AND INABILITY TO CARE FOR SELF- PATIENT NOT SAFE TO BE HOME ALONE.

## 2023-12-24 PROCEDURE — 1090000002 HH PPS REVENUE DEBIT

## 2023-12-24 PROCEDURE — 1090000001 HH PPS REVENUE CREDIT

## 2023-12-25 PROCEDURE — 1090000002 HH PPS REVENUE DEBIT

## 2023-12-25 PROCEDURE — 1090000001 HH PPS REVENUE CREDIT

## 2023-12-26 ENCOUNTER — HOME CARE VISIT (OUTPATIENT)
Dept: HOME HEALTH SERVICES | Facility: HOME HEALTH | Age: 78
End: 2023-12-26
Payer: MEDICARE

## 2023-12-26 PROCEDURE — 1090000002 HH PPS REVENUE DEBIT

## 2023-12-26 PROCEDURE — 1090000001 HH PPS REVENUE CREDIT

## 2023-12-27 ENCOUNTER — HOME CARE VISIT (OUTPATIENT)
Dept: HOME HEALTH SERVICES | Facility: HOME HEALTH | Age: 78
End: 2023-12-27
Payer: MEDICARE

## 2023-12-27 PROCEDURE — 1090000002 HH PPS REVENUE DEBIT

## 2023-12-27 PROCEDURE — 1090000001 HH PPS REVENUE CREDIT

## 2023-12-28 ENCOUNTER — APPOINTMENT (OUTPATIENT)
Dept: HOME HEALTH SERVICES | Facility: HOME HEALTH | Age: 78
End: 2023-12-28
Payer: MEDICARE

## 2023-12-28 ENCOUNTER — HOME CARE VISIT (OUTPATIENT)
Dept: HOME HEALTH SERVICES | Facility: HOME HEALTH | Age: 78
End: 2023-12-28
Payer: MEDICARE

## 2023-12-28 PROCEDURE — 1090000001 HH PPS REVENUE CREDIT

## 2023-12-28 PROCEDURE — 1090000002 HH PPS REVENUE DEBIT

## 2023-12-29 ENCOUNTER — APPOINTMENT (OUTPATIENT)
Dept: HOME HEALTH SERVICES | Facility: HOME HEALTH | Age: 78
End: 2023-12-29
Payer: MEDICARE

## 2023-12-29 PROCEDURE — 1090000001 HH PPS REVENUE CREDIT

## 2023-12-29 PROCEDURE — 1090000002 HH PPS REVENUE DEBIT

## 2023-12-30 PROCEDURE — 1090000001 HH PPS REVENUE CREDIT

## 2023-12-30 PROCEDURE — 1090000002 HH PPS REVENUE DEBIT

## 2023-12-31 PROCEDURE — 1090000002 HH PPS REVENUE DEBIT

## 2023-12-31 PROCEDURE — 1090000001 HH PPS REVENUE CREDIT

## 2024-01-01 PROCEDURE — 1090000001 HH PPS REVENUE CREDIT

## 2024-01-01 PROCEDURE — 1090000002 HH PPS REVENUE DEBIT

## 2024-01-02 ENCOUNTER — HOME CARE VISIT (OUTPATIENT)
Dept: HOME HEALTH SERVICES | Facility: HOME HEALTH | Age: 79
End: 2024-01-02
Payer: MEDICARE

## 2024-01-02 PROCEDURE — 1090000002 HH PPS REVENUE DEBIT

## 2024-01-02 PROCEDURE — 1090000001 HH PPS REVENUE CREDIT

## 2024-01-02 PROCEDURE — G0180 MD CERTIFICATION HHA PATIENT: HCPCS | Performed by: INTERNAL MEDICINE

## 2024-01-03 PROCEDURE — 1090000001 HH PPS REVENUE CREDIT

## 2024-01-03 PROCEDURE — 1090000002 HH PPS REVENUE DEBIT

## 2024-01-04 PROCEDURE — 1090000002 HH PPS REVENUE DEBIT

## 2024-01-04 PROCEDURE — 1090000001 HH PPS REVENUE CREDIT

## 2024-01-05 ENCOUNTER — HOME CARE VISIT (OUTPATIENT)
Dept: HOME HEALTH SERVICES | Facility: HOME HEALTH | Age: 79
End: 2024-01-05
Payer: MEDICARE

## 2024-01-05 PROCEDURE — 1090000001 HH PPS REVENUE CREDIT

## 2024-01-05 PROCEDURE — 1090000002 HH PPS REVENUE DEBIT

## 2024-01-06 ENCOUNTER — APPOINTMENT (OUTPATIENT)
Dept: HOME HEALTH SERVICES | Facility: HOME HEALTH | Age: 79
End: 2024-01-06
Payer: MEDICARE

## 2024-01-06 PROCEDURE — 1090000001 HH PPS REVENUE CREDIT

## 2024-01-06 PROCEDURE — 1090000002 HH PPS REVENUE DEBIT

## 2024-01-07 PROCEDURE — 1090000002 HH PPS REVENUE DEBIT

## 2024-01-07 PROCEDURE — 1090000001 HH PPS REVENUE CREDIT

## 2024-01-08 ENCOUNTER — HOME CARE VISIT (OUTPATIENT)
Dept: HOME HEALTH SERVICES | Facility: HOME HEALTH | Age: 79
End: 2024-01-08
Payer: MEDICARE

## 2024-01-08 PROCEDURE — 1090000001 HH PPS REVENUE CREDIT

## 2024-01-08 PROCEDURE — 1090000002 HH PPS REVENUE DEBIT

## 2024-01-09 ENCOUNTER — HOME CARE VISIT (OUTPATIENT)
Dept: HOME HEALTH SERVICES | Facility: HOME HEALTH | Age: 79
End: 2024-01-09
Payer: MEDICARE

## 2024-01-09 PROCEDURE — 1090000001 HH PPS REVENUE CREDIT

## 2024-01-09 PROCEDURE — 1090000002 HH PPS REVENUE DEBIT

## 2024-01-10 PROCEDURE — 1090000002 HH PPS REVENUE DEBIT

## 2024-01-10 PROCEDURE — 1090000001 HH PPS REVENUE CREDIT

## 2024-01-11 PROCEDURE — 1090000001 HH PPS REVENUE CREDIT

## 2024-01-11 PROCEDURE — 1090000002 HH PPS REVENUE DEBIT

## 2024-01-12 PROCEDURE — 1090000002 HH PPS REVENUE DEBIT

## 2024-01-12 PROCEDURE — 1090000001 HH PPS REVENUE CREDIT

## 2024-01-13 PROCEDURE — 1090000002 HH PPS REVENUE DEBIT

## 2024-01-13 PROCEDURE — 1090000001 HH PPS REVENUE CREDIT

## 2024-01-14 PROCEDURE — 1090000002 HH PPS REVENUE DEBIT

## 2024-01-14 PROCEDURE — 1090000001 HH PPS REVENUE CREDIT

## 2024-01-15 PROCEDURE — 1090000001 HH PPS REVENUE CREDIT

## 2024-01-15 PROCEDURE — 1090000002 HH PPS REVENUE DEBIT

## 2024-01-16 PROCEDURE — 1090000001 HH PPS REVENUE CREDIT

## 2024-01-16 PROCEDURE — 1090000002 HH PPS REVENUE DEBIT

## 2024-01-17 PROCEDURE — 1090000001 HH PPS REVENUE CREDIT

## 2024-01-17 PROCEDURE — 1090000002 HH PPS REVENUE DEBIT

## 2024-01-24 ENCOUNTER — APPOINTMENT (OUTPATIENT)
Dept: HEMATOLOGY/ONCOLOGY | Facility: CLINIC | Age: 79
End: 2024-01-24
Payer: MEDICARE

## 2024-04-08 ENCOUNTER — APPOINTMENT (OUTPATIENT)
Dept: PRIMARY CARE | Facility: CLINIC | Age: 79
End: 2024-04-08